# Patient Record
Sex: MALE
[De-identification: names, ages, dates, MRNs, and addresses within clinical notes are randomized per-mention and may not be internally consistent; named-entity substitution may affect disease eponyms.]

---

## 2018-06-23 ENCOUNTER — HOSPITAL ENCOUNTER (EMERGENCY)
Dept: HOSPITAL 14 - H.ER | Age: 38
Discharge: HOME | End: 2018-06-23
Payer: COMMERCIAL

## 2018-06-23 VITALS
DIASTOLIC BLOOD PRESSURE: 81 MMHG | OXYGEN SATURATION: 100 % | RESPIRATION RATE: 18 BRPM | SYSTOLIC BLOOD PRESSURE: 131 MMHG | HEART RATE: 88 BPM

## 2018-06-23 VITALS — TEMPERATURE: 98.2 F

## 2018-06-23 VITALS — BODY MASS INDEX: 21.5 KG/M2
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
## 2018-06-23 DIAGNOSIS — S13.4XXA: Primary | ICD-10-CM

## 2018-06-23 DIAGNOSIS — S09.90XA: ICD-10-CM
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
## 2018-08-09 ENCOUNTER — HOSPITAL ENCOUNTER (INPATIENT)
Dept: HOSPITAL 14 - H.ER | Age: 38
LOS: 1 days | Discharge: HOME | DRG: 183 | End: 2018-08-10
Attending: FAMILY MEDICINE | Admitting: FAMILY MEDICINE
Payer: SELF-PAY

## 2018-08-09 VITALS — OXYGEN SATURATION: 100 %

## 2018-08-09 VITALS — BODY MASS INDEX: 30.2 KG/M2

## 2018-08-09 DIAGNOSIS — J45.909: ICD-10-CM

## 2018-08-09 DIAGNOSIS — K76.0: ICD-10-CM

## 2018-08-09 DIAGNOSIS — Z91.013: ICD-10-CM

## 2018-08-09 DIAGNOSIS — R73.03: ICD-10-CM

## 2018-08-09 DIAGNOSIS — K57.32: Primary | ICD-10-CM

## 2018-08-09 DIAGNOSIS — K52.9: ICD-10-CM

## 2018-08-09 DIAGNOSIS — R16.2: ICD-10-CM

## 2018-08-09 DIAGNOSIS — E66.9: ICD-10-CM

## 2018-08-09 DIAGNOSIS — E78.2: ICD-10-CM

## 2018-08-09 LAB
ALBUMIN SERPL-MCNC: 4.1 G/DL (ref 3.5–5)
ALBUMIN/GLOB SERPL: 1.3 {RATIO} (ref 1–2.1)
ALT SERPL-CCNC: 27 U/L (ref 21–72)
AST SERPL-CCNC: 20 U/L (ref 17–59)
BASOPHILS # BLD AUTO: 0 K/UL (ref 0–0.2)
BASOPHILS NFR BLD: 0.4 % (ref 0–2)
BILIRUB UR-MCNC: NEGATIVE MG/DL
BUN SERPL-MCNC: 17 MG/DL (ref 9–20)
CALCIUM SERPL-MCNC: 8.9 MG/DL (ref 8.4–10.2)
COLOR UR: YELLOW
EOSINOPHIL # BLD AUTO: 0.1 K/UL (ref 0–0.7)
EOSINOPHIL NFR BLD: 1.3 % (ref 0–4)
ERYTHROCYTE [DISTWIDTH] IN BLOOD BY AUTOMATED COUNT: 13.5 % (ref 11.5–14.5)
GFR NON-AFRICAN AMERICAN: > 60
GLUCOSE UR STRIP-MCNC: >=500 MG/DL
HGB BLD-MCNC: 14.6 G/DL (ref 12–18)
LEUKOCYTE ESTERASE UR-ACNC: (no result) LEU/UL
LYMPHOCYTES # BLD AUTO: 1.7 K/UL (ref 1–4.3)
LYMPHOCYTES NFR BLD AUTO: 23.3 % (ref 20–40)
MCH RBC QN AUTO: 28.5 PG (ref 27–31)
MCHC RBC AUTO-ENTMCNC: 34.8 G/DL (ref 33–37)
MCV RBC AUTO: 82 FL (ref 80–94)
MONOCYTES # BLD: 0.6 K/UL (ref 0–0.8)
MONOCYTES NFR BLD: 7.9 % (ref 0–10)
NEUTROPHILS # BLD: 4.9 K/UL (ref 1.8–7)
NEUTROPHILS NFR BLD AUTO: 67.1 % (ref 50–75)
NRBC BLD AUTO-RTO: 0.1 % (ref 0–0)
PH UR STRIP: 6 [PH] (ref 5–8)
PLATELET # BLD: 304 K/UL (ref 130–400)
PMV BLD AUTO: 9.3 FL (ref 7.2–11.7)
PROT UR STRIP-MCNC: NEGATIVE MG/DL
RBC # BLD AUTO: 5.13 MIL/UL (ref 4.4–5.9)
RBC # UR STRIP: NEGATIVE /UL
SP GR UR STRIP: 1.01 (ref 1–1.03)
URINE CLARITY: CLEAR
UROBILINOGEN UR-MCNC: (no result) MG/DL (ref 0.2–1)
WBC # BLD AUTO: 7.3 K/UL (ref 4.8–10.8)

## 2018-08-09 RX ADMIN — CIPROFLOXACIN SCH MLS/HR: 2 INJECTION, SOLUTION INTRAVENOUS at 21:35

## 2018-08-09 NOTE — CP.PCM.CON
History of Present Illness





- History of Present Illness


History of Present Illness: 


General Surgery Consult


Re: recurrent diverticulitis





HPI: 38M presented to the ED c/o of left lower quadrant pain that began last 

night. Pain was 8/10 in severity. He has had multiple recurrences, with the 

most recent earlier this year and abx relieved his symptoms. + nausea, chills, 

and non-bloody diarrhea. No complaints or other issues at this time, feeling 

better. Last Cscope 1/31/17. 








PMH: Asthma (as child), pre-diabetes, hypertriglyceridemia, diverticulitis (

first in 2015)


PSH: Denies


SH: Social EtOH, no tobacco, or drug use


FH: non contributory


All: NKDA


Meds: Denies





Review of Systems





- Review of Systems


All systems: reviewed and no additional remarkable complaints except (as per HPI

)





Past Patient History





- Infectious Disease


Hx of Infectious Diseases: None





- Tetanus Immunizations


Tetanus Immunization: Unknown





- Past Medical History & Family History


Past Medical History?: Yes





- Past Social History


Smoking Status: Never Smoked


Chewing Tobacco Use: No


Cigar Use: No


Alcohol: None


Drugs: Denies


Home Situation {Lives}: With Family





- CARDIAC


Hx Cardiac Disorders: No


Hx Angina: No


Hx Atrial Fibrillation: No


Hx Cardia Arrhythmia: No


Hx Circulatory Problems: No


Hx Heart Attack: No


Hx Hypercholesterolemia: No





- PULMONARY


Hx Respiratory Disorders: No





- NEUROLOGICAL


Hx Migraine: Yes





- HEENT


Hx HEENT Problems: No





- RENAL


Hx Chronic Kidney Disease: No





- ENDOCRINE/METABOLIC


Hx Endocrine Disorders: Yes


Other/Comment: pre -diabetes





- HEMATOLOGICAL/ONCOLOGICAL


Hx Human Immunodeficiency Virus (HIV): No





- INTEGUMENTARY


Hx Dermatological Problems: No





- MUSCULOSKELETAL/RHEUMATOLOGICAL


Hx Falls: No





- GASTROINTESTINAL


Hx Diverticulitis: Yes





- GENITOURINARY/GYNECOLOGICAL


Hx Genitourinary Disorders: No





- PSYCHIATRIC


Hx Psychophysiologic Disorder: No


Hx Substance Use: No





- SURGICAL HISTORY


Hx Surgeries: No





- ANESTHESIA


Hx Anesthesia: No


Hx Anesthesia Reactions: No





Meds


Allergies/Adverse Reactions: 


 Allergies











Allergy/AdvReac Type Severity Reaction Status Date / Time


 


FISH Allergy  RASH Verified 08/09/18 09:00


 


Fish Containing Products Allergy  RASH Verified 08/09/18 09:00














- Medications


Medications: 


 Current Medications





Enoxaparin Sodium (Lovenox)  40 mg SC DAILY LEORA


   PRN Reason: Protocol


Ciprofloxacin (Cipro 400mg/200ml Dsw)  400 mg in 200 mls @ 200 mls/hr IVPB Q12 

LEORA


   PRN Reason: Protocol


Metronidazole (Flagyl 500mg/100ml Ns)  100 mls @ 100 mls/hr IVPB Q8 LEORA


   PRN Reason: Protocol


Sodium Chloride (Sodium Chloride 0.9%)  1,000 mls @ 125 mls/hr IV .Q8H LEORA


   Stop: 08/10/18 08:44


   Last Admin: 08/09/18 17:33 Dose:  125 mls/hr


Ketorolac Tromethamine (Toradol)  15 mg IVP Q6 PRN


   PRN Reason: Pain, moderate (4-7)


Ketorolac Tromethamine (Toradol)  30 mg IVP Q6 PRN


   PRN Reason: Pain, severe (8-10)











Physical Exam





- Constitutional


Appears: Non-toxic, No Acute Distress





- Head Exam


Head Exam: ATRAUMATIC, NORMOCEPHALIC





- Eye Exam


Eye Exam: EOMI.  absent: Scleral icterus





- ENT Exam


ENT Exam: Mucous Membranes Moist


Additional comments: 





trachea midline





- Respiratory Exam


Respiratory Exam: NORMAL BREATHING PATTERN.  absent: Respiratory Distress





- Cardiovascular Exam


Cardiovascular Exam: RRR, +S1, +S2





- GI/Abdominal Exam


GI & Abdominal Exam: Soft, Tenderness (mild in LLQ).  absent: Distended, Firm, 

Guarding, Hernia, Rebound, Rigid





- Rectal Exam


Rectal Exam: Deferred





- Extremities Exam


Extremities exam: Positive for: normal capillary refill.  Negative for: calf 

tenderness, pedal edema





- Back Exam


Back exam: absent: CVA tenderness (L), CVA tenderness (R)





- Neurological Exam


Neurological exam: Alert, Oriented x3





- Skin


Skin Exam: Dry, Warm





Results





- Vital Signs


Recent Vital Signs: 


 Last Vital Signs











Temp  97.9 F   08/09/18 16:35


 


Pulse  65   08/09/18 16:45


 


Resp  16   08/09/18 16:45


 


BP  130/78   08/09/18 16:35


 


Pulse Ox  98   08/09/18 16:45














- Labs


Result Diagrams: 


 08/09/18 10:10





 08/09/18 10:10


Labs: 


 Laboratory Results - last 24 hr











  08/09/18 08/09/18 08/09/18





  10:10 10:10 10:20


 


WBC  7.3  


 


RBC  5.13  


 


Hgb  14.6  


 


Hct  42.0  


 


MCV  82.0  D  


 


MCH  28.5  


 


MCHC  34.8  


 


RDW  13.5  


 


Plt Count  304  


 


MPV  9.3  


 


Neut % (Auto)  67.1  


 


Lymph % (Auto)  23.3  


 


Mono % (Auto)  7.9  


 


Eos % (Auto)  1.3  


 


Baso % (Auto)  0.4  


 


Neut # (Auto)  4.9  


 


Lymph # (Auto)  1.7  


 


Mono # (Auto)  0.6  


 


Eos # (Auto)  0.1  


 


Baso # (Auto)  0.0  


 


Sodium   143 


 


Potassium   3.6 


 


Chloride   108 H 


 


Carbon Dioxide   25 


 


Anion Gap   14 


 


BUN   17 


 


Creatinine   0.7 L 


 


Est GFR ( Amer)   > 60 


 


Est GFR (Non-Af Amer)   > 60 


 


Random Glucose   110 


 


Calcium   8.9 


 


Total Bilirubin   0.5 


 


AST   20 


 


ALT   27 


 


Alkaline Phosphatase   66 


 


Total Protein   7.2 


 


Albumin   4.1 


 


Globulin   3.1 


 


Albumin/Globulin Ratio   1.3 


 


Urine Color    Yellow


 


Urine Clarity    Clear


 


Urine pH    6.0


 


Ur Specific Gravity    1.013


 


Urine Protein    Negative


 


Urine Glucose (UA)    >=500


 


Urine Ketones    Negative


 


Urine Blood    Negative


 


Urine Nitrate    Negative


 


Urine Bilirubin    Negative


 


Urine Urobilinogen    0.2-1.0


 


Ur Leukocyte Esterase    Neg


 


Urine RBC (Auto)    3


 


Urine Microscopic WBC    < 1














- Imaging and Cardiology


  ** CT scan - abdomen


Status: Image reviewed by me, Report reviewed by me





Assessment & Plan





- Assessment and Plan (Free Text)


Assessment: 


38M with recurrent diverticulitis


Plan: 


IVF


NPO


cont IV abx


Will need colonoscopy in 6-8 weeks prior to any surgical treatment.


Discussed the possibility of a surgical option for treatment after follow up 

colonoscopy with pt.


D/W Dr. Denilson Darling PGY4

## 2018-08-09 NOTE — CT
Date of service: 



08/09/2018



PROCEDURE:  CT Abdomen and Pelvis with contrast



HISTORY:

abd pain llq, history of diverticulitis



COMPARISON:

Abdomen pelvis CT with contrast 05/07/2017.



TECHNIQUE:

Following the intravenous administration of iodinated contrast 

material, a CT examination of the abdomen and pelvis performed from 

the domes of the diaphragms to the symphysis pubis with reformatted 

datasets provided in axial, sagittal and coronal planes. Oral 

contrast was not administered as per referring physician request.



Contrast dose: Omnipaque 300, 95 cc.



Radiation dose:



Total exam DLP = 689.21 mGy-cm.



This CT exam was performed using one or more of the following dose 

reduction techniques: Automated exposure control, adjustment of the 

mA and/or kV according to patient size, and/or use of iterative 

reconstruction technique.



FINDINGS:



LOWER THORAX:

Unremarkable. 



LIVER:

Diminished attenuation is indicative of hepatic steatosis diffusely. 

No gross lesion or ductal dilatation. 



GALLBLADDER AND BILE DUCTS:

Unremarkable. 



PANCREAS:

Unremarkable. No gross lesion or ductal dilatation.



SPLEEN:

Unremarkable. 



ADRENALS:

Unremarkable. No mass. 



KIDNEYS AND URETERS:

Unremarkable. No hydronephrosis. No solid mass. 



VASCULATURE:

Unremarkable. No aortic aneurysm. 



BOWEL:

The stomach is collapsed not well evaluated.  Small bowel is does not 

appear obstructed in the large bowel is normal in caliber overall as 

well.  A nearly identical pattern of diverticulitis affects the 

proximal to mid sigmoid colon although the local pericolic reaction 

is not quite is evident as previously demonstrated. Nevertheless the 

diagnosis is the same.  Differential diagnosis of other infectious or 

inflammatory causes remains as well as underlying potential neoplasm. 

 Follow-up colonoscopy is recommended following therapy.



APPENDIX:

Normal appendix. 



PERITONEUM:

Unremarkable. No free fluid. No free air. 



LYMPH NODES:

Unremarkable. No enlarged lymph nodes. 



BLADDER:

Unremarkable. 



REPRODUCTIVE:

Unremarkable. 



BONES:

No acute fracture. 



OTHER FINDINGS:

None.



IMPRESSION:

Recurrent diverticulitis proximal to mid sigmoid colon not abscess, 

free intrarenal gas or ascites.



Hepatic steatosis mild-to-moderate severity.

## 2018-08-09 NOTE — ED PDOC
HPI: Abdomen


Time Seen by Provider: 08/09/18 09:00


Chief Complaint (Nursing): Abdominal Pain


Chief Complaint (Provider): abdominal pain


History Per: Patient


History/Exam Limitations: language barrier (Geovani  Ryan #[id number])


Onset/Duration Of Symptoms: Days (x1)


Current Symptoms Are (Timing): Still Present


Location Of Pain/Discomfort: LLQ


Associated Symptoms: Nausea.  denies: Fever, Chills, Vomiting, Diarrhea


Additional Complaint(s): 





Torin Mahoney is a 38 year old male, with a past medical history of 

diverticulitis and diabetes, who presents to the emergency department 

complaining of left lower abdominal pain associated with nausea onset since 

yesterday. Patient reports he was diagnosed with diverticulitis this year and 

believes he might have it again. He also reports a headache but denies any fever

, chills, diarrhea or vomiting. No further medical complaints. 





PMD: Clinic.





Past Medical History


Reviewed: Historical Data, Nursing Documentation, Vital Signs


Vital Signs: 


 Last Vital Signs











Temp  97.5 F L  08/09/18 08:51


 


Pulse  81   08/09/18 08:51


 


Resp  20   08/09/18 08:51


 


BP  131/79   08/09/18 08:51


 


Pulse Ox  99   08/09/18 14:50














- Medical History


PMH: Diabetes (borderline, not on meds), Diverticulitis, Migraine


   Denies: HIV, Hypercholesterolemia, Chronic Kidney Disease





- Surgical History


Surgical History: No Surg Hx





- Family History


Family History: States: Unknown Family Hx, Diabetes





- Social History


Current smoker - smoking cessation education provided: No


Alcohol: None


Drugs: Denies





- Immunization History


Hx Tetanus Toxoid Vaccination: No


Hx Influenza Vaccination: No


Hx Pneumococcal Vaccination: No





- Home Medications


Home Medications: 


 Ambulatory Orders











 Medication  Instructions  Recorded


 


No Known Home Med  08/09/18














- Allergies


Allergies/Adverse Reactions: 


 Allergies











Allergy/AdvReac Type Severity Reaction Status Date / Time


 


FISH Allergy  RASH Verified 08/09/18 09:00


 


Fish Containing Products Allergy  RASH Verified 08/09/18 09:00














Review of Systems


ROS Statement: Except As Marked, All Systems Reviewed And Found Negative


Constitutional: Negative for: Fever, Chills


Gastrointestinal: Positive for: Nausea, Abdominal Pain (LLQ).  Negative for: 

Vomiting, Diarrhea


Neurological: Positive for: Headache





Physical Exam





- Reviewed


Nursing Documentation Reviewed: Yes


Vital Signs Reviewed: Yes





- Physical Exam


Appears: Positive for: No Acute Distress


Head Exam: Positive for: ATRAUMATIC, NORMAL INSPECTION, NORMOCEPHALIC


Skin: Positive for: Normal Color, Warm, Dry


Eye Exam: Positive for: Normal appearance, EOMI, PERRL


Neck: Positive for: Painless ROM


Cardiovascular/Chest: Positive for: Regular Rate, Rhythm.  Negative for: Murmur


Respiratory: Positive for: Normal Breath Sounds.  Negative for: Respiratory 

Distress


Gastrointestinal/Abdominal: Positive for: Soft, Tenderness (LLQ)


Back: Positive for: Normal Inspection


Extremity: Positive for: Normal ROM (upper and lower extremities).  Negative for

: Deformity, Swelling


Neurologic/Psych: Positive for: Alert, Oriented





- Laboratory Results


Result Diagrams: 


 08/09/18 10:10





 08/09/18 10:10





- ECG


O2 Sat by Pulse Oximetry: 99 (RA)


Pulse Ox Interpretation: Normal





Medical Decision Making


Medical Decision Making: 





Time: 09:18


Initial Impression: abdominal pain r/o appendicitis and diverticulitis





Initial Plan:





--Abd Pelvis PO & IV contrast [CT]


--CMP


--CBC w/ differential


--Omnipaque 240 50 ml PO


--Toradol 30 mg IV


--Sodium Chloride 1,000 ml  mls/hr


--Zofran Inj 4 mg IV


--Blood culture


--Urine culture


--Urinalysis 


--Reevaluation





Time: 12:31


CT Abd/Pelvis





FINDINGS:





LOWER THORAX:


Unremarkable. 





LIVER:


Diminished attenuation is indicative of hepatic steatosis diffusely. No gross 

lesion or ductal dilatation. 





GALLBLADDER AND BILE DUCTS:


Unremarkable. 





PANCREAS:


Unremarkable. No gross lesion or ductal dilatation.





SPLEEN:


Unremarkable. 





ADRENALS:


Unremarkable. No mass. 





KIDNEYS AND URETERS:


Unremarkable. No hydronephrosis. No solid mass. 





VASCULATURE:


Unremarkable. No aortic aneurysm. 





BOWEL:


The stomach is collapsed not well evaluated.  Small bowel is does not appear 

obstructed in the large bowel is normal in caliber overall as well.  A nearly 

identical pattern of diverticulitis affects the proximal to mid sigmoid colon 

although the local pericolic reaction is not quite is evident as previously 

demonstrated. Nevertheless the diagnosis is the same.  Differential diagnosis 

of other infectious or inflammatory causes remains as well as underlying 

potential neoplasm.  Follow-up colonoscopy is recommended following therapy.





APPENDIX:


Normal appendix. 





PERITONEUM:


Unremarkable. No free fluid. No free air. 





LYMPH NODES:


Unremarkable. No enlarged lymph nodes. 





BLADDER:


Unremarkable. 





REPRODUCTIVE:


Unremarkable. 





BONES:


No acute fracture. 





OTHER FINDINGS:


None.





IMPRESSION:


Recurrent diverticulitis proximal to mid sigmoid colon not abscess, free 

intrarenal gas or ascites.





Hepatic steatosis mild-to-moderate severity.








14:35


-Given the fact is recurrent diverticulitis, patient will be referred to family 

practice. They were made aware. 





--------------------------------------------------------------------------------

-----





Scribe Attestation:


Documented by Tj Gallardo, acting as a scribe for Marian Rodriguez MD.





Provider Scribe Attestation:


All medical record entries made by the Scribe were at my direction and 

personally dictated by me. I have reviewed the chart and agree that the record 

accurately reflects my personal performance of the history, physical exam, 

medical decision making, and the department course for this patient. I have 

also personally directed, reviewed, and agree with the discharge instructions 

and disposition.





Disposition





- Clinical Impression


Clinical Impression: 


 Abdominal pain, Colitis








- Disposition


Disposition Time: 14:35


Condition: IMPROVED

## 2018-08-09 NOTE — CP.PCM.HP
History of Present Illness





- History of Present Illness


History of Present Illness: 


Voyce: [id number]





"I started having pain in my lower abdomen last night"





39 y/o male w/ pmhx of pre-diabetes, hypertriglyceridemia, and diverticulitis (

diagnosed April 2015) who c/o of left lower quadrant pain that began last 

night. He states that the pain is stabbing in nature, 8/10 in severity. He 

states that he had one similar episode this year and that antibiotics relieved 

his symptoms. He denies other alleviating or aggravating factors. He admits to 

nausea, chills, and non-bloody diarrhea. He denies polydipsia, polyphagia, 

polyurea, vomiting, fever. 








PMHx: childhood asthma, pre-diabetes, hypertriglyceridemia, diverticulitis 


Surgical hx: none


Social hx: , lives w/ wife and kids. Works as a cook. Denies etoh, 

cigarette, recreational drug use


Family hx: Father had DM2 and passed away in his 80s


Home Meds: None


Allergies: None


Next of Kin: Wife, Gertrude Bonilla [phone]


Code status: Full code 





ED Course:


VS: T97.5 HR 81 /79 RR 20 02 sat 99


Labs:  CBC & BMP wnl, U/A sig for > 500 glucose, blood and urine cultures


Imaging: Abdominal/Pelvis CT showed diverticulitis proximal to mild sigmoid 

colon, and hepatic steatosis, no small bowel obstruction


Meds: Cirpofloxacin 400 mg IV 200mL/hr, Metronidazole 500 mg 100mL/hr, Toradol 

30mg IV, Zofran 4 mg, 1 L NS bolus  





Present on Admission





- Present on Admission


Any Indicators Present on Admission: No


History of DVT/PE: No


History of Uncontrolled Diabetes: No


Urinary Catheter: No


Decubitus Ulcer Present: No





Review of Systems





- Constitutional


Constitutional: Weight Gain.  absent: Excessive Sweating, Fatigue, Fever, 

Increased Appetite, Night Sweats





- EENT


Eyes: absent: Blurred Vision





- Cardiovascular


Cardiovascular: absent: Chest Pain, Diaphoresis, Dyspnea on Exertion, Leg Edema

, Palpitations





- Respiratory


Respiratory: absent: Cough, Dyspnea, Wheezing





- Gastrointestinal


Gastrointestinal: Abdominal Pain, Cramping, Diarrhea, Nausea.  absent: Bloating

, Constipation, Dyspepsia, Dysphagia, Vomiting





- Genitourinary


Genitourinary: absent: Change in Urinary Stream, Difficulty Urinating, Dysuria, 

Urinary Frequency, Urinary Urgency





- Musculoskeletal


Musculoskeletal: absent: Back Pain, Muscle Cramps, Numbness





- Integumentary


Integumentary: absent: Change in Pigmentation, Unusual Bruising





- Neurological


Neurological: absent: Abnormal Speech, Confusion, Dizziness, Numbness





Past Patient History





- Infectious Disease


Hx of Infectious Diseases: None





- Tetanus Immunizations


Tetanus Immunization: Unknown





- Past Medical History & Family History


Past Medical History?: Yes





- Past Social History


Smoking Status: Never Smoked


Chewing Tobacco Use: No


Cigar Use: No


Alcohol: None


Drugs: Denies


Home Situation {Lives}: With Family





- CARDIAC


Hx Cardiac Disorders: No


Hx Angina: No


Hx Atrial Fibrillation: No


Hx Cardia Arrhythmia: No


Hx Circulatory Problems: No


Hx Heart Attack: No


Hx Hypercholesterolemia: No





- PULMONARY


Hx Respiratory Disorders: No





- NEUROLOGICAL


Hx Migraine: Yes





- HEENT


Hx HEENT Problems: No





- RENAL


Hx Chronic Kidney Disease: No





- ENDOCRINE/METABOLIC


Hx Endocrine Disorders: Yes


Other/Comment: pre -diabetes





- HEMATOLOGICAL/ONCOLOGICAL


Hx Human Immunodeficiency Virus (HIV): No





- INTEGUMENTARY


Hx Dermatological Problems: No





- MUSCULOSKELETAL/RHEUMATOLOGICAL


Hx Falls: No





- GASTROINTESTINAL


Hx Diverticulitis: Yes





- GENITOURINARY/GYNECOLOGICAL


Hx Genitourinary Disorders: No





- PSYCHIATRIC


Hx Psychophysiologic Disorder: No


Hx Substance Use: No





- SURGICAL HISTORY


Hx Surgeries: No





- ANESTHESIA


Hx Anesthesia: No


Hx Anesthesia Reactions: No





Meds


Allergies/Adverse Reactions: 


 Allergies











Allergy/AdvReac Type Severity Reaction Status Date / Time


 


FISH Allergy  RASH Verified 08/09/18 09:00


 


Fish Containing Products Allergy  RASH Verified 08/09/18 09:00














Physical Exam





- Constitutional


Appears: No Acute Distress





- Head Exam


Head Exam: NORMAL INSPECTION





- Eye Exam


Eye Exam: Normal appearance





- ENT Exam


ENT Exam: Mucous Membranes Moist





- Respiratory Exam


Respiratory Exam: Clear to Auscultation Bilateral, NORMAL BREATHING PATTERN.  

absent: Chest Wall Tenderness, Wheezes





- Cardiovascular Exam


Cardiovascular Exam: REGULAR RHYTHM, +S1, +S2





- GI/Abdominal Exam


GI & Abdominal Exam: Distended, Normal Bowel Sounds, Soft, Tenderness.  absent: 

Rebound, Rigid





- Extremities Exam


Extremities exam: Positive for: normal inspection.  Negative for: pedal edema, 

tenderness





- Back Exam


Back exam: absent: CVA tenderness (L), CVA tenderness (R)





- Neurological Exam


Neurological exam: Alert, Oriented x3





- Psychiatric Exam


Psychiatric exam: Normal Affect





- Skin


Skin Exam: Dry, Intact, Warm





Results





- Vital Signs


Recent Vital Signs: 





 Last Vital Signs











Temp  97.5 F L  08/09/18 08:51


 


Pulse  81   08/09/18 08:51


 


Resp  20   08/09/18 08:51


 


BP  131/79   08/09/18 08:51


 


Pulse Ox  99   08/09/18 14:50














- Labs


Result Diagrams: 


 08/09/18 10:10





 08/09/18 10:10


Labs: 





 Laboratory Results - last 24 hr











  08/09/18 08/09/18 08/09/18





  10:10 10:10 10:20


 


WBC  7.3  


 


RBC  5.13  


 


Hgb  14.6  


 


Hct  42.0  


 


MCV  82.0  D  


 


MCH  28.5  


 


MCHC  34.8  


 


RDW  13.5  


 


Plt Count  304  


 


MPV  9.3  


 


Neut % (Auto)  67.1  


 


Lymph % (Auto)  23.3  


 


Mono % (Auto)  7.9  


 


Eos % (Auto)  1.3  


 


Baso % (Auto)  0.4  


 


Neut # (Auto)  4.9  


 


Lymph # (Auto)  1.7  


 


Mono # (Auto)  0.6  


 


Eos # (Auto)  0.1  


 


Baso # (Auto)  0.0  


 


Sodium   143 


 


Potassium   3.6 


 


Chloride   108 H 


 


Carbon Dioxide   25 


 


Anion Gap   14 


 


BUN   17 


 


Creatinine   0.7 L 


 


Est GFR ( Amer)   > 60 


 


Est GFR (Non-Af Amer)   > 60 


 


Random Glucose   110 


 


Calcium   8.9 


 


Total Bilirubin   0.5 


 


AST   20 


 


ALT   27 


 


Alkaline Phosphatase   66 


 


Total Protein   7.2 


 


Albumin   4.1 


 


Globulin   3.1 


 


Albumin/Globulin Ratio   1.3 


 


Urine Color    Yellow


 


Urine Clarity    Clear


 


Urine pH    6.0


 


Ur Specific Gravity    1.013


 


Urine Protein    Negative


 


Urine Glucose (UA)    >=500


 


Urine Ketones    Negative


 


Urine Blood    Negative


 


Urine Nitrate    Negative


 


Urine Bilirubin    Negative


 


Urine Urobilinogen    0.2-1.0


 


Ur Leukocyte Esterase    Neg


 


Urine RBC (Auto)    3


 


Urine Microscopic WBC    < 1














Assessment & Plan





- Assessment and Plan (Free Text)


Assessment: 


39 y/o obese male w/ pmhx of pre-diabetes, hypertriglyceridemia, and 

diverticulitis (dx on CT April 2015) c/o of severe left lower quadrant pain 

that began last night.





Acute on Chronic Diverticulitis


-Symptomatic, uncomplicated


-admit to med surg


-severe abdominal pain, nausea, and malaise


-afebrile, no leukocytosis


-c/w IVF NS 125mL/hr 


-C/w cirpo 400 mg IV and metronidazole 500mg IV


-Zofran 4 mg prn for nausea, Toradol 15mg IV Q6 PRN for moderate, 30 mg IV Q6 

PRN  for severe pain


-GI and surgery consults ordered 


-blood and urine cultures pending


-Abdominal/Pelvis CT showed diverticulitis proximal to mid sigmoid colon, and 

hepatic steatosis, no small bowel obstruction


-last colonoscopy 1/31/17: non-bleeding internal hemorrhoids 





Pre-diabetes


-last hgbA1c  (6/15/2018) 6.1


-random glucose 110, U/A glucose >500


-will follow urine culture 





Hx of Hypertriglycerdemia


-Triglyceride: 243





Diet:


-NPO





DVT prophylaxis:


-Lovenox 40 SC 





Code:


-Full code.











- Date & Time


Date: 08/09/18


Time: 16:21

## 2018-08-10 VITALS
RESPIRATION RATE: 16 BRPM | HEART RATE: 63 BPM | DIASTOLIC BLOOD PRESSURE: 82 MMHG | TEMPERATURE: 97.6 F | SYSTOLIC BLOOD PRESSURE: 132 MMHG

## 2018-08-10 LAB
% IRON SATURATION: 19 % (ref 20–55)
BASOPHILS # BLD AUTO: 0.1 K/UL (ref 0–0.2)
BASOPHILS NFR BLD: 0.9 % (ref 0–2)
BUN SERPL-MCNC: 12 MG/DL (ref 9–20)
CALCIUM SERPL-MCNC: 8.2 MG/DL (ref 8.4–10.2)
EOSINOPHIL # BLD AUTO: 0.2 K/UL (ref 0–0.7)
EOSINOPHIL NFR BLD: 2.5 % (ref 0–4)
ERYTHROCYTE [DISTWIDTH] IN BLOOD BY AUTOMATED COUNT: 13.6 % (ref 11.5–14.5)
GFR NON-AFRICAN AMERICAN: > 60
HGB BLD-MCNC: 14 G/DL (ref 12–18)
IRON SERPL-MCNC: 57 UG/DL (ref 49–181)
LYMPHOCYTES # BLD AUTO: 2.1 K/UL (ref 1–4.3)
LYMPHOCYTES NFR BLD AUTO: 29 % (ref 20–40)
MCH RBC QN AUTO: 28.6 PG (ref 27–31)
MCHC RBC AUTO-ENTMCNC: 34.9 G/DL (ref 33–37)
MCV RBC AUTO: 82 FL (ref 80–94)
MONOCYTES # BLD: 0.6 K/UL (ref 0–0.8)
MONOCYTES NFR BLD: 8.7 % (ref 0–10)
NEUTROPHILS # BLD: 4.3 K/UL (ref 1.8–7)
NEUTROPHILS NFR BLD AUTO: 58.9 % (ref 50–75)
NRBC BLD AUTO-RTO: 0.1 % (ref 0–0)
PLATELET # BLD: 292 K/UL (ref 130–400)
PMV BLD AUTO: 9 FL (ref 7.2–11.7)
RBC # BLD AUTO: 4.9 MIL/UL (ref 4.4–5.9)
TIBC SERPL-MCNC: 294 UG/DL (ref 250–450)
WBC # BLD AUTO: 7.3 K/UL (ref 4.8–10.8)

## 2018-08-10 RX ADMIN — METRONIDAZOLE SCH MLS/HR: 500 INJECTION, SOLUTION INTRAVENOUS at 13:25

## 2018-08-10 RX ADMIN — CIPROFLOXACIN SCH MLS/HR: 2 INJECTION, SOLUTION INTRAVENOUS at 09:06

## 2018-08-10 RX ADMIN — METRONIDAZOLE SCH MLS/HR: 500 INJECTION, SOLUTION INTRAVENOUS at 04:01

## 2018-08-10 NOTE — CP.PCM.PN
Subjective





- Date & Time of Evaluation


Date of Evaluation: 08/10/18


Time of Evaluation: 08:28





- Subjective


Subjective: 





General surgery progress note for Dr. Oreilly-Natalie Harrington, PGY-2





Pt S & E at bedside at 0720





Pt reports abdominal pain much improved.  Had one episode of non bloody 

diarrhea.  Denies N & V, F & C, other complaints. 








Objective





- Vital Signs/Intake and Output


Vital Signs (last 24 hours): 


 











Temp Pulse Resp BP Pulse Ox


 


 97.5 F L  58 L  20   115/68   100 


 


 08/10/18 05:00  08/10/18 05:00  08/10/18 05:00  08/10/18 05:00  08/10/18 05:00











- Medications


Medications: 


 Current Medications





Enoxaparin Sodium (Lovenox)  40 mg SC DAILY [facility]


   PRN Reason: Protocol


Ciprofloxacin (Cipro 400mg/200ml Dsw)  400 mg in 200 mls @ 200 mls/hr IVPB Q12 

[facility]


   PRN Reason: Protocol


   Last Admin: 08/09/18 21:35 Dose:  200 mls/hr


Sodium Chloride (Sodium Chloride 0.9%)  1,000 mls @ 125 mls/hr IV .Q8H [facility]


   Stop: 08/10/18 08:44


   Last Admin: 08/10/18 04:00 Dose:  125 mls/hr


Metronidazole (Flagyl 500mg/100ml Ns)  100 mls @ 100 mls/hr IVPB Q8@0500,1300,

2100 [facility]


   PRN Reason: Protocol


   Last Admin: 08/10/18 04:01 Dose:  100 mls/hr


Ketorolac Tromethamine (Toradol)  15 mg IVP Q6 PRN


   PRN Reason: Pain, moderate (4-7)


Ketorolac Tromethamine (Toradol)  30 mg IVP Q6 PRN


   PRN Reason: Pain, severe (8-10)











- Labs


Labs: 


 





 08/09/18 10:10 





 08/09/18 10:10 











- Constitutional


Appears: Non-toxic, No Acute Distress





- Head Exam


Head Exam: ATRAUMATIC, NORMAL INSPECTION, NORMOCEPHALIC





- Eye Exam


Eye Exam: EOMI, Normal appearance





- ENT Exam


ENT Exam: Mucous Membranes Moist, Normal Exam





- Neck Exam


Neck Exam: Full ROM, Normal Inspection





- Respiratory Exam


Respiratory Exam: NORMAL BREATHING PATTERN





- Cardiovascular Exam


Cardiovascular Exam: REGULAR RHYTHM





- GI/Abdominal Exam


GI & Abdominal Exam: Soft, Tenderness (mild, LLQ).  absent: Distended, Firm, 

Guarding, Rigid





- Extremities Exam


Extremities Exam: Normal Inspection





- Neurological Exam


Neurological Exam: Alert, Awake, CN II-XII Intact, Oriented x3





- Psychiatric Exam


Psychiatric exam: Normal Affect, Normal Mood





- Skin


Skin Exam: Dry, Intact, Normal Color, Warm





Assessment and Plan





- Assessment and Plan (Free Text)


Assessment: 





38M w/recurrent diverticulitis- improving





Plan: 





Ok to advance to clears


Cont Abx


FU AM labs


Pain control PRN


Will need colonoscopy in 6-8 weeks prior to surgical intervention





DW attending





Juany, PGY-2

## 2018-08-10 NOTE — CP.PCM.CON
<FrancoisdangeloLeonidas - Last Filed: 08/10/18 11:43>





History of Present Illness





- History of Present Illness


History of Present Illness: 





GI Fellow PGY4, Consult note.





Consulted for recurrent diverticulitis.





Torin Mahoney is a 38M  M with history of diverticulitis 

presenting with LLQ abdominal pain. Pain started just PTA, sharp, 8/10 located 

in the LLQ of abdomen. These symptoms are identical to previous episodes of 

diverticulitis. He states he has been following a strict diet, avoiding steak, 

nuts and seed, and eating leafy, green salads. He denies fever, vomiting an 

diarrhea at this time. This is his 4th episodes of diverticulitis in the last 2-

3 years. He had a colonoscopy in 1/17 which did not show an underlying mass or 

inflammatory process. CT on this admission shows return of the same inflamed 

colon without abscess. The CT demonstrated hepatosteatosis.





PMHx - See above. Dyslipidemia, obesity


PSHx - CSPY 1/17


FMHx - Denies colon, or stomach related cancers. Denies liver disease.


SocHx - Denies tobacco or alcohol use. 





12pt ROS completed and negative except for above.





Past Patient History





- Infectious Disease


Hx of Infectious Diseases: None





- Tetanus Immunizations


Tetanus Immunization: Unknown





- Past Medical History & Family History


Past Medical History?: Yes





- Past Social History


Smoking Status: Never Smoked


Chewing Tobacco Use: No


Cigar Use: No


Alcohol: None


Drugs: Denies


Home Situation {Lives}: With Family





- CARDIAC


Hx Cardiac Disorders: No


Hx Angina: No


Hx Atrial Fibrillation: No


Hx Cardia Arrhythmia: No


Hx Circulatory Problems: No


Hx Heart Attack: No


Hx Hypercholesterolemia: No





- PULMONARY


Hx Respiratory Disorders: No





- NEUROLOGICAL


Hx Migraine: Yes





- HEENT


Hx HEENT Problems: No





- RENAL


Hx Chronic Kidney Disease: No





- ENDOCRINE/METABOLIC


Hx Endocrine Disorders: Yes


Other/Comment: pre -diabetes





- HEMATOLOGICAL/ONCOLOGICAL


Hx Human Immunodeficiency Virus (HIV): No





- INTEGUMENTARY


Hx Dermatological Problems: No





- MUSCULOSKELETAL/RHEUMATOLOGICAL


Hx Falls: No





- GASTROINTESTINAL


Hx Diverticulitis: Yes





- GENITOURINARY/GYNECOLOGICAL


Hx Genitourinary Disorders: No





- PSYCHIATRIC


Hx Psychophysiologic Disorder: No


Hx Substance Use: No





- SURGICAL HISTORY


Hx Surgeries: No





- ANESTHESIA


Hx Anesthesia: No


Hx Anesthesia Reactions: No





Meds


Allergies/Adverse Reactions: 


 Allergies











Allergy/AdvReac Type Severity Reaction Status Date / Time


 


FISH Allergy  RASH Verified 08/09/18 09:00


 


Fish Containing Products Allergy  RASH Verified 08/09/18 09:00














- Medications


Medications: 


 Current Medications





Enoxaparin Sodium (Lovenox)  40 mg SC DAILY [facility]


   PRN Reason: Protocol


Ciprofloxacin (Cipro 400mg/200ml Dsw)  400 mg in 200 mls @ 200 mls/hr IVPB Q12 

[facility]


   PRN Reason: Protocol


   Last Admin: 08/09/18 21:35 Dose:  200 mls/hr


Sodium Chloride (Sodium Chloride 0.9%)  1,000 mls @ 125 mls/hr IV .Q8H [facility]


   Stop: 08/10/18 08:44


   Last Admin: 08/10/18 04:00 Dose:  125 mls/hr


Metronidazole (Flagyl 500mg/100ml Ns)  100 mls @ 100 mls/hr IVPB Q8@0500,1300,

2100 [facility]


   PRN Reason: Protocol


   Last Admin: 08/10/18 04:01 Dose:  100 mls/hr


Ketorolac Tromethamine (Toradol)  15 mg IVP Q6 PRN


   PRN Reason: Pain, moderate (4-7)


Ketorolac Tromethamine (Toradol)  30 mg IVP Q6 PRN


   PRN Reason: Pain, severe (8-10)











Physical Exam





- Constitutional


Appears: Well, In Acute Distress





- Head Exam


Head Exam: NORMAL INSPECTION





- Eye Exam


Eye Exam: EOMI, Normal appearance





- ENT Exam


ENT Exam: Mucous Membranes Moist, Normal Exam





- Respiratory Exam


Respiratory Exam: Clear to Auscultation Bilateral, NORMAL BREATHING PATTERN.  

absent: Stridor





- Cardiovascular Exam


Cardiovascular Exam: REGULAR RHYTHM, +S1, +S2





- GI/Abdominal Exam


GI & Abdominal Exam: Normal Bowel Sounds, Soft, Tenderness.  absent: 

Organomegaly


Additional comments: 





LLQ pain with light palpation





- Rectal Exam


Rectal Exam: Deferred





- Extremities Exam


Extremities exam: Positive for: normal inspection





- Neurological Exam


Neurological exam: Alert, CN II-XII Intact, Oriented x3





- Psychiatric Exam


Psychiatric exam: Normal Affect, Normal Mood





- Skin


Skin Exam: Dry, Intact, Normal Color





Results





- Vital Signs


Recent Vital Signs: 


 Last Vital Signs











Temp  97.5 F L  08/10/18 05:00


 


Pulse  58 L  08/10/18 05:00


 


Resp  20   08/10/18 05:00


 


BP  115/68   08/10/18 05:00


 


Pulse Ox  100   08/10/18 05:00














- Labs


Result Diagrams: 


 08/10/18 08:52





 08/10/18 08:52


Labs: 


 Laboratory Results - last 24 hr











  08/09/18 08/09/18 08/09/18





  10:10 10:10 10:20


 


WBC  7.3  


 


RBC  5.13  


 


Hgb  14.6  


 


Hct  42.0  


 


MCV  82.0  D  


 


MCH  28.5  


 


MCHC  34.8  


 


RDW  13.5  


 


Plt Count  304  


 


MPV  9.3  


 


Neut % (Auto)  67.1  


 


Lymph % (Auto)  23.3  


 


Mono % (Auto)  7.9  


 


Eos % (Auto)  1.3  


 


Baso % (Auto)  0.4  


 


Neut # (Auto)  4.9  


 


Lymph # (Auto)  1.7  


 


Mono # (Auto)  0.6  


 


Eos # (Auto)  0.1  


 


Baso # (Auto)  0.0  


 


Sodium   143 


 


Potassium   3.6 


 


Chloride   108 H 


 


Carbon Dioxide   25 


 


Anion Gap   14 


 


BUN   17 


 


Creatinine   0.7 L 


 


Est GFR ( Amer)   > 60 


 


Est GFR (Non-Af Amer)   > 60 


 


Random Glucose   110 


 


Calcium   8.9 


 


Total Bilirubin   0.5 


 


AST   20 


 


ALT   27 


 


Alkaline Phosphatase   66 


 


Total Protein   7.2 


 


Albumin   4.1 


 


Globulin   3.1 


 


Albumin/Globulin Ratio   1.3 


 


Urine Color    Yellow


 


Urine Clarity    Clear


 


Urine pH    6.0


 


Ur Specific Gravity    1.013


 


Urine Protein    Negative


 


Urine Glucose (UA)    >=500


 


Urine Ketones    Negative


 


Urine Blood    Negative


 


Urine Nitrate    Negative


 


Urine Bilirubin    Negative


 


Urine Urobilinogen    0.2-1.0


 


Ur Leukocyte Esterase    Neg


 


Urine RBC (Auto)    3


 


Urine Microscopic WBC    < 1














Assessment & Plan





- Assessment and Plan (Free Text)


Assessment: 





38M with recurrent diverticulitis





#Acute Diverticulitis, recurrent


#Hepatosplenomegaly


#Mixed dyslipidemia


#Obesity


Plan: 





-CT abd/pelv reviewed and noted for inflammation of sigmoid and hepatosteatosis


-Continue Abx for GNR and anaerobes


-Agree with surgical evaluation


-Diet: Can advance to full liquid.


-Follow up with Dr. Patel in 6-8 weeks for colonoscopy evaluation


-Discussed liver findings with patient. Likely from NAFLD. Hep panel neg in 

2015. Will order iron studies.


-Educated patient on lifestyle changes i.e. exercise, blood sugar control, 

weight loss.


-No planned procedures at this time.








- Date & Time


Date: 08/10/18


Time: 07:22





<Giacomo Matute Y - Last Filed: 08/10/18 12:59>





Meds





- Medications


Medications: 


 Current Medications





Enoxaparin Sodium (Lovenox)  40 mg SC DAILY [facility]


   PRN Reason: Protocol


   Last Admin: 08/10/18 09:09 Dose:  Not Given


Ciprofloxacin (Cipro 400mg/200ml Dsw)  400 mg in 200 mls @ 200 mls/hr IVPB Q12 

[facility]


   PRN Reason: Protocol


   Last Admin: 08/10/18 09:06 Dose:  200 mls/hr


Metronidazole (Flagyl 500mg/100ml Ns)  100 mls @ 100 mls/hr IVPB Q8@0500,1300,

2100 [facility]


   PRN Reason: Protocol


   Last Admin: 08/10/18 04:01 Dose:  100 mls/hr


Ketorolac Tromethamine (Toradol)  15 mg IVP Q6 PRN


   PRN Reason: Pain, moderate (4-7)


Ketorolac Tromethamine (Toradol)  30 mg IVP Q6 PRN


   PRN Reason: Pain, severe (8-10)











Results





- Vital Signs


Recent Vital Signs: 


 Last Vital Signs











Temp  97.6 F   08/10/18 09:00


 


Pulse  63   08/10/18 09:00


 


Resp  16   08/10/18 09:00


 


BP  132/82   08/10/18 09:00


 


Pulse Ox  100   08/10/18 09:00














- Labs


Result Diagrams: 


 08/10/18 08:52





 08/10/18 08:52


Labs: 


 Laboratory Results - last 24 hr











  08/10/18 08/10/18 08/10/18





  08:52 08:52 08:52


 


WBC  7.3  


 


RBC  4.90  


 


Hgb  14.0  


 


Hct  40.2  


 


MCV  82.0  


 


MCH  28.6  


 


MCHC  34.9  


 


RDW  13.6  


 


Plt Count  292  


 


MPV  9.0  


 


Neut % (Auto)  58.9  


 


Lymph % (Auto)  29.0  


 


Mono % (Auto)  8.7  


 


Eos % (Auto)  2.5  


 


Baso % (Auto)  0.9  


 


Neut # (Auto)  4.3  


 


Lymph # (Auto)  2.1  


 


Mono # (Auto)  0.6  


 


Eos # (Auto)  0.2  


 


Baso # (Auto)  0.1  


 


Sodium   141 


 


Potassium   3.4 L 


 


Chloride   107 


 


Carbon Dioxide   25 


 


Anion Gap   12 


 


BUN   12 


 


Creatinine   0.8 


 


Est GFR ( Amer)   > 60 


 


Est GFR (Non-Af Amer)   > 60 


 


Random Glucose   98 


 


Calcium   8.2 L 


 


Phosphorus   3.4 


 


Magnesium   1.8 


 


Iron    57


 


TIBC    294


 


% Saturation    19 L














Attending/Attestation





- Attestation


I have personally seen and examined this patient.: Yes


I have fully participated in the care of the patient.: Yes


I have reviewed all pertinent clinical information: Yes


Notes (Text): 





08/10/18 12:54


I have seen and examined patient with GI fellow.  Agree with above 

documentation with the following additions.  In brief, this is a 38 year old 

male with history of recurrent diverticulitis who presents to hospital with 

complaint of sudden onset abdominal pain which started yesterday.  He describes 

a sharp, 8/10 intensity LLQ abdominal pain which feels similar to prior 

episodes.  He denies associated nausea, vomiting, fever/chills, weight loss, 

rectal bleeding, or diarrhea.  Since arrival to hospital his pain has 

significantly improved and he is tolerating PO liquids without difficulty.  

Review of vitals from today are normal.





History of recurrent diverticulitis


Acute abdominal pain - uncomplicated sigmoid diverticulitis





- Full liquid diet as tolerated


- Continue with antibiotic therapy


- Follow up surgical recommendations given recurrent disease


- Patient had colonoscopy in early 2017, however if surgical resection is 

considered he would benefit from repeat procedure prior to intervention which 

can be performed electively as outpatient in 2 months following resolution of 

acute symptoms.  Will continue to monitor patient clinical course.

## 2018-08-10 NOTE — CP.PCM.PN
Subjective





- Date & Time of Evaluation


Date of Evaluation: 08/10/18


Time of Evaluation: 10:17





- Subjective


Subjective: 


Patient seen and examined at bedside.


He endorses having improving left lower abdominal pain.


He states that had a loose BM last night.


Per nurse, patient did not receive any pain meds over night.


Patient's charts, labs, and nurse notes reviewed. 


  





Objective





- Vital Signs/Intake and Output


Vital Signs (last 24 hours): 


 











Temp Pulse Resp BP Pulse Ox


 


 97.6 F   63   16   132/82   100 


 


 08/10/18 09:00  08/10/18 09:00  08/10/18 09:00  08/10/18 09:00  08/10/18 09:00











- Medications


Medications: 


 Current Medications





Enoxaparin Sodium (Lovenox)  40 mg SC DAILY [facility]


   PRN Reason: Protocol


   Last Admin: 08/10/18 09:09 Dose:  Not Given


Ciprofloxacin (Cipro 400mg/200ml Dsw)  400 mg in 200 mls @ 200 mls/hr IVPB Q12 

[facility]


   PRN Reason: Protocol


   Last Admin: 08/10/18 09:06 Dose:  200 mls/hr


Metronidazole (Flagyl 500mg/100ml Ns)  100 mls @ 100 mls/hr IVPB Q8@0500,1300,

2100 [facility]


   PRN Reason: Protocol


   Last Admin: 08/10/18 04:01 Dose:  100 mls/hr


Ketorolac Tromethamine (Toradol)  15 mg IVP Q6 PRN


   PRN Reason: Pain, moderate (4-7)


Ketorolac Tromethamine (Toradol)  30 mg IVP Q6 PRN


   PRN Reason: Pain, severe (8-10)











- Labs


Labs: 


 





 08/10/18 08:52 





 08/10/18 08:52 











- Constitutional


Appears: No Acute Distress





- Eye Exam


Eye Exam: Normal appearance





- ENT Exam


ENT Exam: Mucous Membranes Moist





- Respiratory Exam


Respiratory Exam: Clear to Ausculation Bilateral, NORMAL BREATHING PATTERN





- Cardiovascular Exam


Cardiovascular Exam: REGULAR RHYTHM, RRR, +S1, +S2





- GI/Abdominal Exam


GI & Abdominal Exam: Soft, Tenderness (LLQ), Normal Bowel Sounds.  absent: 

Distended, Guarding, Rigid, Rebound





- Extremities Exam


Extremities Exam: Normal Inspection.  absent: Pedal Edema, Tenderness





- Back Exam


Back Exam: NORMAL INSPECTION.  absent: CVA tenderness (L), CVA tenderness (R)





- Neurological Exam


Neurological Exam: Alert, Awake, Oriented x3





- Psychiatric Exam


Psychiatric exam: Normal Affect, Normal Mood





- Skin


Skin Exam: Dry, Intact, Warm





Assessment and Plan





- Assessment and Plan (Free Text)


Assessment: 


37 y/o obese male w/ pmhx of pre-diabetes, hypertriglyceridemia, and 

diverticulitis (dx on CT April 2015) admitted for severe abdominal pain.





Acute on Chronic Diverticulitis


-Symptomatic, uncomplicated


-pain improving


-afebrile, no leukocytosis


-C/w cirpo 400 mg IV and metronidazole 500mg IV day #2/10 


-Toradol 15mg IV Q6 PRN for moderate, 30 mg IV Q6 PRN  for severe pain


-GI and surgery consulted; appreciate recs; patient will  need outpatient 

colonoscopy prior to surgical intervention 


-blood cx: no growth after 24 hrs. Urine cultures no growth


-Abdominal/Pelvis CT showed "pattern of diverticulitis" proximal to mid sigmoid 

colon w/ ddx infectious/inflammatory causes and underlying potential neoplasm 


-last colonoscopy 1/31/17: non-bleeding internal hemorrhoids, no diverticulosis/

diverticulitis





Pre-diabetes


-last hgbA1c  (6/15/2018) 6.1


-random glucose 110, U/A glucose >500


-will follow urine culture 





Hx of Hypertriglycerdemia


-Triglyceride: 243


-will monitor outpatient 





Diet:


-advance diet as tolerated





DVT prophylaxis:


-Lovenox 40 SC 





Code:


-Full code.

## 2018-08-12 NOTE — CP.PCM.DIS
Provider





- Provider


Date of Admission: 


08/09/18 14:28





Attending physician: 


Layla Power MD





Consults: 


Dr. Patel (GI), Dr. Oreilly (surgery)


Time Spent in preparation of Discharge (in minutes): 35





Diagnosis





- Discharge Diagnosis


(1) Abdominal pain


Status: Resolved   Priority: Low   





(2) Colitis


Status: Chronic   Priority: Low   





Hospital Course





- Lab Results


Lab Results: 


 Micro Results





08/09/18 10:10   Blood-Venous   Blood Culture - Preliminary


                            NO GROWTH AFTER 3 DAYS


08/09/18 10:20   Urine,Clean Catch   Urine Culture - Final


                            No Growth (<1,000 CFU/ML)





 Most Recent Lab Values











WBC  7.3 K/uL (4.8-10.8)   08/10/18  08:52    


 


RBC  4.90 Mil/uL (4.40-5.90)   08/10/18  08:52    


 


Hgb  14.0 g/dL (12.0-18.0)   08/10/18  08:52    


 


Hct  40.2 % (35.0-51.0)   08/10/18  08:52    


 


MCV  82.0 fl (80.0-94.0)   08/10/18  08:52    


 


MCH  28.6 pg (27.0-31.0)   08/10/18  08:52    


 


MCHC  34.9 g/dL (33.0-37.0)   08/10/18  08:52    


 


RDW  13.6 % (11.5-14.5)   08/10/18  08:52    


 


Plt Count  292 K/uL (130-400)   08/10/18  08:52    


 


MPV  9.0 fl (7.2-11.7)   08/10/18  08:52    


 


Neut % (Auto)  58.9 % (50.0-75.0)   08/10/18  08:52    


 


Lymph % (Auto)  29.0 % (20.0-40.0)   08/10/18  08:52    


 


Mono % (Auto)  8.7 % (0.0-10.0)   08/10/18  08:52    


 


Eos % (Auto)  2.5 % (0.0-4.0)   08/10/18  08:52    


 


Baso % (Auto)  0.9 % (0.0-2.0)   08/10/18  08:52    


 


Neut # (Auto)  4.3 K/uL (1.8-7.0)   08/10/18  08:52    


 


Lymph # (Auto)  2.1 K/uL (1.0-4.3)   08/10/18  08:52    


 


Mono # (Auto)  0.6 K/uL (0.0-0.8)   08/10/18  08:52    


 


Eos # (Auto)  0.2 K/uL (0.0-0.7)   08/10/18  08:52    


 


Baso # (Auto)  0.1 K/uL (0.0-0.2)   08/10/18  08:52    


 


Sodium  141 mmol/l (132-148)   08/10/18  08:52    


 


Potassium  3.4 MMOL/L (3.6-5.0)  L  08/10/18  08:52    


 


Chloride  107 mmol/L ()   08/10/18  08:52    


 


Carbon Dioxide  25 mmol/L (22-30)   08/10/18  08:52    


 


Anion Gap  12  (10-20)   08/10/18  08:52    


 


BUN  12 mg/dl (9-20)   08/10/18  08:52    


 


Creatinine  0.8 mg/dl (0.8-1.5)   08/10/18  08:52    


 


Est GFR ( Amer)  > 60   08/10/18  08:52    


 


Est GFR (Non-Af Amer)  > 60   08/10/18  08:52    


 


Random Glucose  98 mg/dL ()   08/10/18  08:52    


 


Calcium  8.2 mg/dL (8.4-10.2)  L  08/10/18  08:52    


 


Phosphorus  3.4 mg/dl (2.5-4.5)   08/10/18  08:52    


 


Magnesium  1.8 MG/DL (1.6-2.3)   08/10/18  08:52    


 


Iron  57 ug/dL ()   08/10/18  08:52    


 


TIBC  294 ug/dL (250-450)   08/10/18  08:52    


 


% Saturation  19 % (20-55)  L  08/10/18  08:52    


 


Total Bilirubin  0.5 mg/dl (0.2-1.3)   08/09/18  10:10    


 


AST  20 U/L (17-59)   08/09/18  10:10    


 


ALT  27 U/L (21-72)   08/09/18  10:10    


 


Alkaline Phosphatase  66 U/L ()   08/09/18  10:10    


 


Total Protein  7.2 G/DL (6.3-8.2)   08/09/18  10:10    


 


Albumin  4.1 g/dL (3.5-5.0)   08/09/18  10:10    


 


Globulin  3.1 gm/dL (2.2-3.9)   08/09/18  10:10    


 


Albumin/Globulin Ratio  1.3  (1.0-2.1)   08/09/18  10:10    


 


Urine Color  Yellow  (YELLOW)   08/09/18  10:20    


 


Urine Clarity  Clear  (Clear)   08/09/18  10:20    


 


Urine pH  6.0  (5.0-8.0)   08/09/18  10:20    


 


Ur Specific Gravity  1.013  (1.003-1.030)   08/09/18  10:20    


 


Urine Protein  Negative mg/dL (NEGATIVE)   08/09/18  10:20    


 


Urine Glucose (UA)  >=500 mg/dL (Normal)   08/09/18  10:20    


 


Urine Ketones  Negative mg/dL (NEGATIVE)   08/09/18  10:20    


 


Urine Blood  Negative  (NEGATIVE)   08/09/18  10:20    


 


Urine Nitrate  Negative  (NEGATIVE)   08/09/18  10:20    


 


Urine Bilirubin  Negative  (NEGATIVE)   08/09/18  10:20    


 


Urine Urobilinogen  0.2-1.0 mg/dL (0.2-1.0)   08/09/18  10:20    


 


Ur Leukocyte Esterase  Neg Bertram/uL (Negative)   08/09/18  10:20    


 


Urine RBC (Auto)  3 /hpf (0-3)   08/09/18  10:20    


 


Urine Microscopic WBC  < 1 /hpf (0-5)   08/09/18  10:20    














- Hospital Course


Hospital Course: 


39 y/o male w/ pmhx of pre-diabetes, hypertriglyceridemia, and diverticulitis (

diagnosed April 2015 via abd CT) who was admitted for severe left lower 

quadrant abdominal pain. Abdominal/Pelvis CT showed pattern of diverticulitis 

proximal to mid sigmoid colon w/ ddx infectious/inflammatory causes and 

underlying potential neoplasm. (Last colonoscopy 1/31/17: non-bleeding internal 

hemorrhoids, no evidence of diverticulosis). Patient was started on  

Cirpofloxacin 400 mg IV 200mL/hr, Metronidazole 500 mg 100mL/hr, Toradol 30mg IV

, Zofran 4 mg, and given IVF 1 L NS bolus. Surgery was consulted and 

recommended a repeat colonoscopy outpatient in 2 months prior to surgical 

intervention. Patient's symptoms improved; he tolerated advancement of diet. 

Patient advised to continue antibiotics for a total of 10 days. He was 

discharged to home and will follow up in [facility] on 8/17/2018 at 3PM w/ Dr. Chris Reese. He will need a referral to GI clinic.  





Patient was disharged on:





Metronidazole 500mg PO Q8 for 8 days


Cirpofloxacin 500mg PO Q12 for 8 days   





Follow blood culture








- Date & Time of H&P


Date of H&P: 08/09/18


Time of H&P: 15:30





Discharge Exam





- Head Exam


Head Exam: NORMAL INSPECTION





- Eye Exam


Eye Exam: Normal appearance.  absent: Scleral icterus





- ENT Exam


ENT Exam: Mucous Membranes Moist





- Respiratory Exam


Respiratory Exam: Clear to PA & Lateral, NORMAL BREATHING PATTERN





- Cardiovascular Exam


Cardiovascular Exam: REGULAR RHYTHM, +S1, +S2





- GI/Abdominal Exam


GI & Abdominal Exam: Normal Bowel Sounds, Unremarkable.  absent: Distended, 

Guarding, Rebound, Rigid, Tenderness





- Extremities Exam


Extremities exam: normal inspection





- Back Exam


Back exam: absent: CVA tenderness (L), CVA tenderness (R)





- Neurological Exam


Neurological exam: Alert, Oriented x3





- Psychiatric Exam


Psychiatric exam: Normal Affect, Normal Mood





- Skin


Skin Exam: Dry, Intact, Warm





Discharge Plan





- Discharge Medications


Prescriptions: 


Ciprofloxacin [Cipro] 500 mg PO Q12 8 Days  tab


Metronidazole [Flagyl] 500 mg PO Q8 8 Days  tablet





- Follow Up Plan


Condition: IMPROVED


Disposition: HOME/ ROUTINE


Patient education suggested?: Yes


Instructions:  Diverticulitis, Acute Abdominal Pain (DC)


Additional Instructions: 


follow up in the [facility] as scheduled for 8/17/18 at 

3PM w/ Dr. Chris Reese. Will refer patient to GI clinic at [facility]


Take antibiotics as prescribed 


Return to the ED with any worsening or concerning symptoms


Referrals: 


 Service [Outside]


[facility] [Outside]


Kenneth Figueroa MD [Staff Provider] -

## 2018-12-16 ENCOUNTER — HOSPITAL ENCOUNTER (EMERGENCY)
Dept: HOSPITAL 14 - H.ER | Age: 38
Discharge: HOME | End: 2018-12-16
Payer: SELF-PAY

## 2018-12-16 VITALS
SYSTOLIC BLOOD PRESSURE: 142 MMHG | HEART RATE: 75 BPM | TEMPERATURE: 98.2 F | DIASTOLIC BLOOD PRESSURE: 78 MMHG | RESPIRATION RATE: 18 BRPM

## 2018-12-16 VITALS — BODY MASS INDEX: 29.9 KG/M2

## 2018-12-16 DIAGNOSIS — N39.0: Primary | ICD-10-CM

## 2018-12-16 DIAGNOSIS — R30.0: ICD-10-CM

## 2018-12-16 LAB
ALBUMIN SERPL-MCNC: 4.1 G/DL (ref 3.5–5)
ALBUMIN/GLOB SERPL: 1.2 {RATIO} (ref 1–2.1)
ALT SERPL-CCNC: 26 U/L (ref 21–72)
AST SERPL-CCNC: 17 U/L (ref 17–59)
BASOPHILS # BLD AUTO: 0.1 K/UL (ref 0–0.2)
BASOPHILS NFR BLD: 0.7 % (ref 0–2)
BILIRUB UR-MCNC: NEGATIVE MG/DL
BUN SERPL-MCNC: 18 MG/DL (ref 9–20)
CALCIUM SERPL-MCNC: 9.1 MG/DL (ref 8.4–10.2)
COLOR UR: YELLOW
EOSINOPHIL # BLD AUTO: 0.2 K/UL (ref 0–0.7)
EOSINOPHIL NFR BLD: 2.8 % (ref 0–4)
ERYTHROCYTE [DISTWIDTH] IN BLOOD BY AUTOMATED COUNT: 13.8 % (ref 11.5–14.5)
GFR NON-AFRICAN AMERICAN: > 60
GLUCOSE UR STRIP-MCNC: (no result) MG/DL
HGB BLD-MCNC: 14.7 G/DL (ref 12–18)
LEUKOCYTE ESTERASE UR-ACNC: (no result) LEU/UL
LYMPHOCYTES # BLD AUTO: 2.1 K/UL (ref 1–4.3)
LYMPHOCYTES NFR BLD AUTO: 26.8 % (ref 20–40)
MCH RBC QN AUTO: 27.8 PG (ref 27–31)
MCHC RBC AUTO-ENTMCNC: 33.2 G/DL (ref 33–37)
MCV RBC AUTO: 83.6 FL (ref 80–94)
MONOCYTES # BLD: 0.9 K/UL (ref 0–0.8)
MONOCYTES NFR BLD: 12 % (ref 0–10)
NEUTROPHILS # BLD: 4.5 K/UL (ref 1.8–7)
NEUTROPHILS NFR BLD AUTO: 57.7 % (ref 50–75)
NRBC BLD AUTO-RTO: 0.1 % (ref 0–0)
PH UR STRIP: 5 [PH] (ref 5–8)
PLATELET # BLD: 325 K/UL (ref 130–400)
PMV BLD AUTO: 8.7 FL (ref 7.2–11.7)
PROT UR STRIP-MCNC: 30 MG/DL
RBC # BLD AUTO: 5.28 MIL/UL (ref 4.4–5.9)
RBC # UR STRIP: NEGATIVE /UL
SP GR UR STRIP: 1.02 (ref 1–1.03)
SQUAMOUS EPITHIAL: < 1 /HPF (ref 0–5)
URINE CLARITY: (no result)
UROBILINOGEN UR-MCNC: (no result) MG/DL (ref 0.2–1)
WBC # BLD AUTO: 7.9 K/UL (ref 4.8–10.8)

## 2018-12-16 NOTE — ED PDOC
HPI: Male  Pain


Time Seen by Provider: 18 11:57


Chief Complaint (Nursing): Abdominal Pain


Chief Complaint (Provider): Abdominal pain


History Per: Patient


History/Exam Limitations: no limitations


Additional Complaint(s): 





Pt reports suprapubic pain with dysuria X 1 week, intermittent, no radiation, 

feels different from diverticulitis pain.  Denies fever, nausea, vomiting, 

constipation, diarrhea, hematuria, back pain. 





Past Medical History


Reviewed: Nursing Documentation, Vital Signs


Vital Signs: 





                                Last Vital Signs











Temp  97.5 F L  18 11:47


 


Pulse  84   18 11:47


 


Resp      


 


BP  134/84   18 11:47


 


Pulse Ox  98   18 11:47














- Medical History


PMH: Diabetes (borderline, not on meds), Diverticulitis, Migraine


   Denies: Atrial Fibrillation, Cardia Arrhythmia, HIV, Hypercholesterolemia, 

Chronic Kidney Disease





- Family History


Family History: States: Unknown Family Hx, Diabetes





- Social History


Current smoker - smoking cessation education provided: No


Alcohol: None





- Immunization History


Hx Tetanus Toxoid Vaccination: No


Hx Influenza Vaccination: No


Hx Pneumococcal Vaccination: No





- Home Medications


Home Medications: 


                                Ambulatory Orders











 Medication  Instructions  Recorded


 


Ciprofloxacin HCl [Cipro] 500 mg PO BID #14 tab 18


 


Metronidazole [Flagyl] 500 mg PO Q8 #21 tab 18


 


Nitrofurantoin Macrocrystals 100 mg PO BID #13 cap 18





[Macrobid]  


 


Phenazopyridine [Pyridium] 200 mg PO TID PRN #6 tab 18














- Allergies


Allergies/Adverse Reactions: 


                                    Allergies











Allergy/AdvReac Type Severity Reaction Status Date / Time


 


FISH Allergy  RASH Verified 18 11:52


 


Fish Containing Products Allergy  RASH Verified 18 11:52














Review of Systems


Constitutional: Negative for: Fever, Chills


Cardiovascular: Negative for: Chest Pain


Respiratory: Negative for: Cough


Gastrointestinal: Positive for: Abdominal Pain.  Negative for: Nausea, Vomiting,

 Diarrhea, Constipation, Hematochezia, Hematemesis


Genitourinary Male: Positive for: Dysuria.  Negative for: Frequency, 

Incontinence, Hematuria, Penile Discharge, Scrotal Pain, Rash, Penile Pain


Musculoskeletal: Negative for: Back Pain


Skin: Negative for: Rash, Lesions


Neurological: Negative for: Headache





Physical Exam





- Reviewed


Nursing Documentation Reviewed: Yes


Vital Signs Reviewed: Yes





- Physical Exam


Appears: Positive for: Well, No Acute Distress


Head Exam: Positive for: ATRAUMATIC, NORMAL INSPECTION


Skin: Positive for: Normal Color, Warm, Dry


Eye Exam: Positive for: Normal appearance, EOMI, PERRL


Cardiovascular/Chest: Positive for: Regular Rate, Rhythm


Respiratory: Positive for: Normal Breath Sounds


Gastrointestinal/Abdominal: Positive for: Bowel Sounds, Soft, Tenderness 

(Suprapubic).  Negative for: Mass, Distended, Guarding, Rebound


Back: Positive for: Normal Inspection.  Negative for: L CVA Tenderness, R CVA 

Tenderness


Neurologic/Psych: Positive for: Alert, Oriented





- Laboratory Results


Result Diagrams: 


                                 18 14:30





                                 18 14:30





- ECG


O2 Sat by Pulse Oximetry: 98





Medical Decision Making


Medical Decision Makin yo male with dysuria and suprapubic pain.


- UA


- urine culture


- renal/bladder ultrasound





Accession No. : [id number]


Patient Name / ID : MAYNOR WILLIS  / [id number]


Exam Date : 2018 15:17:11 ( Approved )


Study Comment : 


Sex / Age : M  / 38Y





Creator : Lenin Oden MD


Dictator : Lenin Oden MD


 : 


Approver : Lenin Oden MD


Approver2 : 





Report Date : 2018 16:15:02


My Comment : 


******************************************

*****************************************





Date of service: 





2018





PROCEDURE:  Ultrasound of the Kidneys





HISTORY:


Suprapubic pain





COMPARISON:


Abdomen pelvis CT 2018..





TECHNIQUE:


Sonogram of the kidneys.





FINDINGS:





RIGHT KIDNEY:


Measures: 11.5 x 4.4 x 4.9 cm. 





Normal in size, contour and echogenicity. 





No stone, solid mass lesion or hydronephrosis visualized. 





LEFT KIDNEY:


Measures: 10.5 x 6.2 x 5.6 cm. 





Normal in size, contour and echogenicity. 





No stone, solid mass lesion or hydronephrosis visualized. 





OTHER FINDINGS:


Unremarkable urinary bladder 10.4 cc postvoid residual from prevoid volume of 

149.3 cc.





IMPRESSION:


No suspicious interval renal disease sonographically identified bilaterally as 

compared prior and pelvis CT 2018.





Disposition





- Clinical Impression


Clinical Impression: 


 Dysuria, Symptomatic urinary tract infection








- Disposition


Referrals: 


[facility] [Outside]


Disposition: Routine/Home


Disposition Time: 16:32


Condition: STABLE


Prescriptions: 


Nitrofurantoin Macrocrystals [Macrobid] 100 mg PO BID #13 cap


Phenazopyridine [Pyridium] 200 mg PO TID PRN #6 tab


 PRN Reason: Bladder Spasm


Instructions:  Urinary Tract Infections in Adults, Dysuria, Adult (DC)


Forms:  [employer] (Moldovan)


Print Language: Czech

## 2018-12-16 NOTE — US
Date of service: 



12/16/2018



PROCEDURE:  Ultrasound of the Kidneys



HISTORY:

Suprapubic pain



COMPARISON:

Abdomen pelvis CT 09/07/2018..



TECHNIQUE:

Sonogram of the kidneys.



FINDINGS:



RIGHT KIDNEY:

Measures: 11.5 x 4.4 x 4.9 cm. 



Normal in size, contour and echogenicity. 



No stone, solid mass lesion or hydronephrosis visualized. 



LEFT KIDNEY:

Measures: 10.5 x 6.2 x 5.6 cm. 



Normal in size, contour and echogenicity. 



No stone, solid mass lesion or hydronephrosis visualized. 



OTHER FINDINGS:

Unremarkable urinary bladder 10.4 cc postvoid residual from prevoid 

volume of 149.3 cc.



IMPRESSION:

No suspicious interval renal disease sonographically identified 

bilaterally as compared prior and pelvis CT 09/07/2018.

## 2018-12-18 VITALS — OXYGEN SATURATION: 98 %

## 2019-02-23 ENCOUNTER — HOSPITAL ENCOUNTER (OUTPATIENT)
Dept: HOSPITAL 14 - H.ER | Age: 39
Setting detail: OBSERVATION
Discharge: HOME | End: 2019-02-23
Attending: HOSPITALIST | Admitting: HOSPITALIST
Payer: SELF-PAY

## 2019-02-23 VITALS — TEMPERATURE: 98.2 F

## 2019-02-23 VITALS — OXYGEN SATURATION: 97 % | SYSTOLIC BLOOD PRESSURE: 128 MMHG | HEART RATE: 75 BPM | DIASTOLIC BLOOD PRESSURE: 71 MMHG

## 2019-02-23 VITALS — RESPIRATION RATE: 16 BRPM

## 2019-02-23 VITALS — BODY MASS INDEX: 29.9 KG/M2

## 2019-02-23 DIAGNOSIS — E78.1: ICD-10-CM

## 2019-02-23 DIAGNOSIS — R73.03: ICD-10-CM

## 2019-02-23 DIAGNOSIS — R47.01: ICD-10-CM

## 2019-02-23 DIAGNOSIS — G43.909: ICD-10-CM

## 2019-02-23 DIAGNOSIS — R47.1: ICD-10-CM

## 2019-02-23 DIAGNOSIS — R05: ICD-10-CM

## 2019-02-23 DIAGNOSIS — E78.5: ICD-10-CM

## 2019-02-23 DIAGNOSIS — H53.8: ICD-10-CM

## 2019-02-23 DIAGNOSIS — R53.1: Primary | ICD-10-CM

## 2019-02-23 DIAGNOSIS — Z79.899: ICD-10-CM

## 2019-02-23 DIAGNOSIS — Z83.3: ICD-10-CM

## 2019-02-23 DIAGNOSIS — Z79.82: ICD-10-CM

## 2019-02-23 LAB
ALBUMIN SERPL-MCNC: 3.8 G/DL (ref 3.5–5)
ALBUMIN/GLOB SERPL: 1.1 {RATIO} (ref 1–2.1)
ALT SERPL-CCNC: 27 U/L (ref 21–72)
APTT BLD: 32.7 SECONDS (ref 25.6–37.1)
AST SERPL-CCNC: 21 U/L (ref 17–59)
BASOPHILS # BLD AUTO: 0 K/UL (ref 0–0.2)
BASOPHILS NFR BLD: 0.4 % (ref 0–2)
BUN SERPL-MCNC: 22 MG/DL (ref 9–20)
CALCIUM SERPL-MCNC: 9.1 MG/DL (ref 8.4–10.2)
EOSINOPHIL # BLD AUTO: 0.2 K/UL (ref 0–0.7)
EOSINOPHIL NFR BLD: 3.4 % (ref 0–4)
ERYTHROCYTE [DISTWIDTH] IN BLOOD BY AUTOMATED COUNT: 13.7 % (ref 11.5–14.5)
GFR NON-AFRICAN AMERICAN: > 60
HDLC SERPL-MCNC: 28 MG/DL (ref 30–70)
HGB BLD-MCNC: 14.9 G/DL (ref 12–18)
INR PPP: 0.9
LDLC SERPL-MCNC: 85 MG/DL (ref 0–129)
LYMPHOCYTES # BLD AUTO: 2.4 K/UL (ref 1–4.3)
LYMPHOCYTES NFR BLD AUTO: 35.9 % (ref 20–40)
MCH RBC QN AUTO: 28 PG (ref 27–31)
MCHC RBC AUTO-ENTMCNC: 33.6 G/DL (ref 33–37)
MCV RBC AUTO: 83.5 FL (ref 80–94)
MONOCYTES # BLD: 0.7 K/UL (ref 0–0.8)
MONOCYTES NFR BLD: 9.9 % (ref 0–10)
NEUTROPHILS # BLD: 3.4 K/UL (ref 1.8–7)
NEUTROPHILS NFR BLD AUTO: 50.4 % (ref 50–75)
NRBC BLD AUTO-RTO: 0.1 % (ref 0–0)
PLATELET # BLD: 305 K/UL (ref 130–400)
PMV BLD AUTO: 9.3 FL (ref 7.2–11.7)
PROTHROMBIN TIME: 10.4 SECONDS (ref 9.8–13.1)
RBC # BLD AUTO: 5.32 MIL/UL (ref 4.4–5.9)
WBC # BLD AUTO: 6.8 K/UL (ref 4.8–10.8)

## 2019-02-23 PROCEDURE — 96361 HYDRATE IV INFUSION ADD-ON: CPT

## 2019-02-23 PROCEDURE — 71045 X-RAY EXAM CHEST 1 VIEW: CPT

## 2019-02-23 PROCEDURE — 70496 CT ANGIOGRAPHY HEAD: CPT

## 2019-02-23 PROCEDURE — 70498 CT ANGIOGRAPHY NECK: CPT

## 2019-02-23 PROCEDURE — 80061 LIPID PANEL: CPT

## 2019-02-23 PROCEDURE — 93005 ELECTROCARDIOGRAM TRACING: CPT

## 2019-02-23 PROCEDURE — 86900 BLOOD TYPING SEROLOGIC ABO: CPT

## 2019-02-23 PROCEDURE — 70551 MRI BRAIN STEM W/O DYE: CPT

## 2019-02-23 PROCEDURE — 99284 EMERGENCY DEPT VISIT MOD MDM: CPT

## 2019-02-23 PROCEDURE — 97161 PT EVAL LOW COMPLEX 20 MIN: CPT

## 2019-02-23 PROCEDURE — 86850 RBC ANTIBODY SCREEN: CPT

## 2019-02-23 PROCEDURE — 96360 HYDRATION IV INFUSION INIT: CPT

## 2019-02-23 PROCEDURE — 85610 PROTHROMBIN TIME: CPT

## 2019-02-23 PROCEDURE — 70450 CT HEAD/BRAIN W/O DYE: CPT

## 2019-02-23 PROCEDURE — 82948 REAGENT STRIP/BLOOD GLUCOSE: CPT

## 2019-02-23 PROCEDURE — 85025 COMPLETE CBC W/AUTO DIFF WBC: CPT

## 2019-02-23 PROCEDURE — 84484 ASSAY OF TROPONIN QUANT: CPT

## 2019-02-23 PROCEDURE — 83036 HEMOGLOBIN GLYCOSYLATED A1C: CPT

## 2019-02-23 PROCEDURE — 80053 COMPREHEN METABOLIC PANEL: CPT

## 2019-02-23 PROCEDURE — 85730 THROMBOPLASTIN TIME PARTIAL: CPT

## 2019-02-23 NOTE — MRI
Date of service: 



02/23/2019



PROCEDURE:  MRI BRAIN WITHOUT CONTRAST



HISTORY:

TIA 



COMPARISON:

CT head without contrast performed earlier the same day. 



TECHNIQUE:

Multiplanar, multisequence MR images of the brain were obtained 

without intravenous contrast enhancement.



FINDINGS:



HEMORRHAGE:

None



DWI:

No evidence of an acute or early subacute infarction.



BRAIN PARENCHYMA:

There are few scattered tiny T2/FLAIR hyperintense foci in the 

bifrontal subcortical white matter.  There is no mass, mass effect or 

abnormal extra-axial fluid collection.  There is mild cerebellar 

tonsillar ectopia. 



VENTRICLES:

There is mild age advanced global parenchymal volume loss and 

proportionate enlargement of the ventricles and cortical sulci. 



CRANIUM:

There is normal bone marrow signal pattern.



ORBITS:

Grossly unremarkable.



PARANASAL SINUSES/MASTOIDS:

There are retention cysts/polyps in both maxillary sinus



VASCULAR SYSTEM:

There are normal signal voids in the larger intracranial arteries. 



OTHER FINDINGS:

None. 



IMPRESSION:

No acute intracranial abnormality.



Minimal bifrontal white matter changes are strictly nonspecific.  The 

differential considerations include migraine headache effect, gliosis,

 early chronic microvascular changes, Lyme disease, vasculitis and 

demyelinating disease including multiple sclerosis.  Clinical 

follow-up is advised.



Mild cerebellar tonsillar ectopia.



Retention cysts/polyps in both maxillary sinuses.

## 2019-02-23 NOTE — RAD
Date of service: 



02/23/2019



HISTORY:

 Code Stroke 



COMPARISON:

09/05/2018 



FINDINGS:



LUNGS:

No active pulmonary disease.



PLEURA:

No significant pleural effusion identified, no pneumothorax apparent.



CARDIOVASCULAR:

No atherosclerotic calcification present



Normal.



OSSEOUS STRUCTURES:

No significant abnormalities.



VISUALIZED UPPER ABDOMEN:

Normal.



OTHER FINDINGS:

None.



IMPRESSION:

No active disease. No significant interval change compared to the 

prior examination(s).

## 2019-02-23 NOTE — CT
Date of service: 



02/23/2019



PROCEDURE:  CT HEAD WITHOUT CONTRAST.



HISTORY:

code stroke



COMPARISON:

06/23/2018.



TECHNIQUE:

Axial computed tomography images were obtained through the head/brain 

without intravenous contrast.  



Supplemental Coronal and Sagittal projections created and reviewed.



Radiation dose:



Total exam DLP = <inf_radiation_dlp> mGy-cm.



This CT exam was performed using one or more of the following dose 

reduction techniques: Automated exposure control, adjustment of the 

mA and/or kV according to patient size, and/or use of iterative 

reconstruction technique.



FINDINGS:



HEMORRHAGE:

No intracranial hemorrhage. 



BRAIN:

No mass effect or edema.  No atrophy or chronic microvascular 

ischemic changes.



VENTRICLES:

Unremarkable. No hydrocephalus. 



CALVARIUM:

Unremarkable.



PARANASAL SINUSES:

Unremarkable as visualized. No significant inflammatory changes.



MASTOID AIR CELLS:

Unremarkable as visualized. No inflammatory changes.



OTHER FINDINGS:

None.



IMPRESSION:

No acute intracranial abnormalities. No significant findings to 

account for the clinical presentation.  No significant interval 

change compared to the prior examination(s).



__________________________________________



Code stroke protocol:



Study completed 07:48.



Interpretation finalized and available for review 08:03.

## 2019-02-23 NOTE — ED PDOC
HPI:STROKE





- Historian


Historian: Patient,  ([id number])





- Chief Complaint


Chief Complaint: Weakness (left sided )





- Onset


Onset: This morning (just prior to 6am)





- Location


Locate left: Lower extremity





- Notes:


Notes:: 





Torin Mahoney is a 38 year old male with a past medical history of 

diverticulitis and pre-diabetes, who presents to the emergency department 

complaining of left sided weakness. Patient states that he went to the bathroom 

around 6am and went to bed right after. He states he lay in bed staring up at 

the wall and started to fell left-sided weakness and that the whole left side 

would not move as tried to reach for his phone. Patient states that he 

remembered reading online that he should try to cough in this situation. He 

reports that he was initially unable to cough, but after continued attempts was 

able to cough harder and further states that his numbness/weakness started to go

away. Patient tried calling 911, however reports that they hung up on him 

because they were unable to understand him. After about x20 minutes, he 

experienced the symptoms again, prompting him to call 911 again, who told him 

that EMS was on their way. Patient is complaining of mild blurry vision and had 

some trouble speaking. He states that he currently has some dizziness. 





PMD: [facility]








NIHSS Stroke Scale





- Date/Time Evaluation Performed


When Was NIHSS Performed: Baseline





- How Severe is the Stroke


Level of Consciousness: 0=Alert


LOC to Questions: 0=Both comments correct


LOC to commands: 0=Obeys both correctly


Best Gaze: 0=Normal


Visual: 0=No visual loss


Facial: 0=Normal


Motor Arm - Left: 0=No drift


Motor Arm - Right: 0=No drift


Motor Leg - Left: 1=Drift before 5 sec


Motor Leg - Right: 0=No drift


Limb Ataxia: 0=Absent


Sensory: 0=Normal


Best Language: 0=No aphasia


Dysarthia: 0=Normal articulation


Extinction & Inattention (Neglect): 0=Normal, no object


Score: 1





rTPA Inclusion/Exclusion





- Refusal of Treatment


Patient Refused Treatment: No





- Inclusion Criteria for Altepase


Patient is 18 years or Older: Yes


The Clinical Diagnosis of Ischemic Stroke That is Causing a Potentially 

Disabling Neurological Deficit: No


Time of Onset is Well Established to be Less Than 270 Minute Before Treatment 

Would Begin: No


Risk/Benefit Discussed With Patient/Family Member Present: Yes





- Exclusion Criteria for Altepase


Uncontrolled Hypertension at Time of Treatment (Systolic BP above 185 or 

Diastolic BP above 110 mmHg): No


Active Internal Bleeding: No


Known Bleeding Diathesis Including but Not Limited to: Platelets Below 

100,000/mm,PTT Above 40 sec After Heparin Use, Current Use of Oral Anitcoagulant

With INR Greater Than 1.7 or PT Greater Than 15 secs: No


Evidence of an Intracranial Hemorrhage: No


Evidence of Major Acute Infarct With Signs Greater Than 1/3 MCA Territory: No


Suspicion of Subarachnoid Hemorrhage on Pretreatment Evaluation Even if CT Head 

Negative For Hemorrhage: No





- Warning to TPA With Conditions


Following Conditions Weighed Against Anticipated Benefit: No


Condition: Stroke Serevity Too Mild (case d/w Dr. Menard after initial CT head)





Past Medical History


Reviewed: Historical Data, Nursing Documentation, Vital Signs


Vital Signs: 





                                Last Vital Signs











Temp  98.2 F   02/23/19 08:01


 


Pulse  61   02/23/19 08:01


 


Resp  16   02/23/19 08:01


 


BP  128/77   02/23/19 08:01


 


Pulse Ox  100   02/23/19 08:01














- Medical History


PMH: Diabetes (borderline, not on meds), Diverticulitis, Migraine


   Denies: Atrial Fibrillation, Cardia Arrhythmia, HIV, Hypercholesterolemia, 

Chronic Kidney Disease





- Surgical History


Surgical History: No Surg Hx





- Family History


Family History: States: Diabetes





- Immunization History


Hx Tetanus Toxoid Vaccination: No


Hx Influenza Vaccination: No


Hx Pneumococcal Vaccination: No





- Home Medications


Home Medications: 


                                Ambulatory Orders











 Medication  Instructions  Recorded


 


Ciprofloxacin HCl [Cipro] 500 mg PO BID #14 tab 09/07/18


 


Metronidazole [Flagyl] 500 mg PO Q8 #21 tab 09/07/18


 


Nitrofurantoin Macrocrystals 100 mg PO BID #13 cap 12/16/18





[Macrobid]  


 


Phenazopyridine [Pyridium] 200 mg PO TID PRN #6 tab 12/16/18














- Allergies


Allergies/Adverse Reactions: 


                                    Allergies











Allergy/AdvReac Type Severity Reaction Status Date / Time


 


FISH Allergy  RASH Verified 02/23/19 09:01


 


Fish Containing Products Allergy  RASH Verified 02/23/19 09:01














Review of Systems


ROS Statement: Except As Marked, All Systems Reviewed And Found Negative


Eyes: Positive for: Vision Change


Neurological: Positive for: Weakness, Dizziness





Physical Exam





- Reviewed


Nursing Documentation Reviewed: Yes


Vital Signs Reviewed: Yes





- Physical Exam


Appears: Positive for: Non-toxic, No Acute Distress


Head Exam: Positive for: ATRAUMATIC, NORMOCEPHALIC


Skin: Positive for: Normal Color, Warm, Dry


Eye Exam: Positive for: Normal appearance, EOMI, PERRL


ENT: Positive for: Normal ENT Inspection


Neck: Positive for: Normal, Painless ROM, Supple


Cardiovascular/Chest: Positive for: Regular Rate, Rhythm.  Negative for: Murmur


Respiratory: Positive for: Normal Breath Sounds.  Negative for: Respiratory 

Distress


Back: Positive for: Normal Inspection.  Negative for: L CVA Tenderness, R CVA 

Tenderness, Vertebral Tenderness


Extremity: Negative for: Pedal Edema, Deformity


Neurologic/Psych: Positive for: Alert, Oriented, Other (weakness in left lower 

extremity with a decrease in sensation).  Negative for: Facial Droop





- Laboratory Results


Result Diagrams: 


                                 02/23/19 07:50





                                 02/23/19 07:50


Lab Results: 





                                        











PT  10.4 Seconds (9.8-13.1)   02/23/19  07:50    


 


INR  0.9   02/23/19  07:50    


 


APTT  32.7 Seconds (25.6-37.1)   02/23/19  07:50    








                                        











Total Bilirubin  0.3 mg/dl (0.2-1.3)   02/23/19  07:50    


 


AST  21 U/L (17-59)   02/23/19  07:50    


 


ALT  27 U/L (21-72)   02/23/19  07:50    


 


Alkaline Phosphatase  103 U/L ()   02/23/19  07:50    


 


Total Protein  7.2 G/DL (6.3-8.2)   02/23/19  07:50    


 


Albumin  3.8 g/dL (3.5-5.0)   02/23/19  07:50    


 


Globulin  3.4 gm/dL (2.2-3.9)   02/23/19  07:50    


 


Albumin/Globulin Ratio  1.1  (1.0-2.1)   02/23/19  07:50    














- ECG


O2 Sat by Pulse Oximetry: 100 (RA)


Pulse Ox Interpretation: Normal





Medical Decision Making


Medical Decision Making: 





Time: 0750


Impression: possible stroke


Plan: 





--Type and screen 


--CT head without contrast


--EKG


--CMP


--Hemoglobin A1C


--Lipid panel 


--Troponin I 


--PT


--PTT


--CBC with differential 


--Stroke team consult


--Chest xray 


--Sodium chloride 1,000 ml


--Cardiac monitor


--Saline lock


--Vital signs Q15 min


--Glucose, Blood 


--Nursing swallow screen





Time: 0803


Head CT Finding: 





HISTORY:


code stroke





COMPARISON:


06/23/2018.





TECHNIQUE:


Axial computed tomography images were obtained through the head/brain without 

intravenous contrast.  





Supplemental Coronal and Sagittal projections created and reviewed.





Radiation dose:





Total exam DLP = <inf_radiation_dlp> mGy-cm.





This CT exam was performed using one or more of the following dose reduction 

techniques: Automated exposure control, adjustment of the mA and/or kV according

 to patient size, and/or use of iterative reconstruction technique.





FINDINGS:





HEMORRHAGE:


No intracranial hemorrhage. 





BRAIN:


No mass effect or edema.  No atrophy or chronic microvascular ischemic changes.





VENTRICLES:


Unremarkable. No hydrocephalus. 





CALVARIUM:


Unremarkable.





PARANASAL SINUSES:


Unremarkable as visualized. No significant inflammatory changes.





MASTOID AIR CELLS:


Unremarkable as visualized. No inflammatory changes.





OTHER FINDINGS:


None.





IMPRESSION:


No acute intracranial abnormalities. No significant findings to account for the 

clinical presentation.  No significant interval change compared to the prior 

examination(s).





__________________________________________





Code stroke protocol:





Study completed 07:48.





Interpretation finalized and available for review 08:03.








Time: 0810


--Spoke with Dr. Menard, who asked for a CT of the head and neck. 








Time: 0947


CT head/neck FINDINGS:





HEAD:


Right:





 The intracranial internal carotid artery, and anterior and middle cerebral 

arteries are widely patent.





Left:





The intracranial internal carotid artery, and anterior and middle cerebral 

arteries are widely patent. 





Posterior circulation: 





The visualized intracranial vertebral arteries, basilar artery and posterior 

cerebral arteries are widely patent.





There is no endoluminal filling defect to suggest thrombus. There is no 

intracranial saccular aneurysm.





NECK:


There is a three vessel aortic arch. There is no stenosis at the origins of the 

great vessels at the level of the aortic arch. 





No atherosclerotic calcification or mural plaque present.





Right Carotid:





On the right, the common carotid, internal carotid and external carotid arteries

 are widely patent.





There is no hemodynamically significant stenosis in the internal carotid artery 

by NASCET criteria.





Left Carotid:





On the left, the common carotid, internal carotid and external carotid arteries 

are widely patent.





There is no hemodynamically significant stenosis in the internal carotid artery 

by NASCET criteria.





The vertebral arteries are widely patent. 





The visualized soft tissues of the neck are normal. The visualized brain and 

cervical spine are within normal limits.





The lung apices are clear. 





IMPRESSION:


1. No evidence of endoluminal thrombus,occlusion or definite significant 

stenosis in the intracranial arteries. 





2. No evidence of hemodynamically significant stenosis in the internal carotid 

arteries.





3. Patent bilateral vertebral arteries.








Time: 1020


--Patient's symptoms have improved. He is showing no signs of weakness or 

decreased sensation in left lower extremity. 





Time: 1021


--Discussed CT neck findings with Dr. Menard. 


--------

--------------------------------------------------------------------------------


---------





Scribe Attestation:


Documented by Godwin Guerrero, acting as a scribe for Monika Bennett MD. 





Provider Scribe Attestation:


All medical record entries made by the Scribe were at my direction and 

personally dictated by me. I have reviewed the chart and agree that the record 

accurately reflects my personal performance of the history, physical exam, 

medical decision making, and the department course for this patient. I have also

 personally directed, reviewed, and agree with the discharge instructions and d

isposition.





Disposition





- Clinical Impression


Clinical Impression: 


 TIA (transient ischemic attack)








- Patient ED Disposition


Is Patient to be Admitted: Yes


Doctor Will See Patient In The: Hospital





- Disposition


Disposition: Transfer of Care


Disposition Time: 10:20


Condition: FAIR


Instructions:  Transient Ischemic Attack


Forms:  Selleration (English)





- Pt Status Changed To:


Hospital Disposition Of: Observation





- POA


Present On Arrival: None

## 2019-02-23 NOTE — CT
Date of service: 



02/23/2019



PROCEDURE:  CTA HEAD AND NECK WITH CONTRAST



HISTORY:

Left leg weakness



COMPARISON:

None available. 



TECHNIQUE:

Initial noncontrast head CT was performed. Subsequently, CT angiogram 

of the head and neck were performed after the intravenous 

administration of 80 mL of Omnipaque 350.  Contiguous 1.5mm thick 

images were obtained in the axial plane of the neck. 2-D coronal and 

sagittal MPR images were obtained. Imaging postprocessing was 

performed with 3-D images also obtained. A delayed contrast head CT 

was also obtained. This CT exam was performed using one or more of 

the following dose reduction techniques: Automated exposure control, 

adjustment of the mA and/or kV according to patient size, and/or use 

of iterative reconstruction technique.



Contrast dose: 99 cc Visipaque 320



Radiation dose:



Total exam DLP = 494.26 mGy-cm.



FINDINGS:



HEAD:

Right:



 The intracranial internal carotid artery, and anterior and middle 

cerebral arteries are widely patent.



Left:



The intracranial internal carotid artery, and anterior and middle 

cerebral arteries are widely patent. 



Posterior circulation: 



The visualized intracranial vertebral arteries, basilar artery and 

posterior cerebral arteries are widely patent.



There is no endoluminal filling defect to suggest thrombus. There is 

no intracranial saccular aneurysm.



NECK:

There is a three vessel aortic arch. There is no stenosis at the 

origins of the great vessels at the level of the aortic arch. 



No atherosclerotic calcification or mural plaque present.



Right Carotid:



On the right, the common carotid, internal carotid and external 

carotid arteries are widely patent.



There is no hemodynamically significant stenosis in the internal 

carotid artery by NASCET criteria.



Left Carotid:



On the left, the common carotid, internal carotid and external 

carotid arteries are widely patent.



There is no hemodynamically significant stenosis in the internal 

carotid artery by NASCET criteria.



The vertebral arteries are widely patent. 



The visualized soft tissues of the neck are normal. The visualized 

brain and cervical spine are within normal limits.



The lung apices are clear. 



IMPRESSION:

1. No evidence of endoluminal thrombus,occlusion or definite 

significant stenosis in the intracranial arteries. 



2. No evidence of hemodynamically significant stenosis in the 

internal carotid arteries.



3. Patent bilateral vertebral arteries.

## 2019-02-23 NOTE — CP.PCM.CON
History of Present Illness





- History of Present Illness


History of Present Illness: 





Neurology consult dictated. 


NIH stroke scale 0.


in brief, 38 yr old male who had a spell of leg weakness that resolved and is 

now back to baseline. MRI Brain is normal with no sign of acute stroke, CTA head

and neck is normal as well. 


ROS: negative. 





A/p: Patient with event not likely to be TIA, ready for discharge. 


Plan:


1. Continue aspirin


2. Neurology follow up with our office in 2 weeks - dr wade


Thank you


                                 





Past Patient History





- Infectious Disease


Hx of Infectious Diseases: None





- Tetanus Immunizations


Tetanus Immunization: Unknown





- Past Medical History & Family History


Past Medical History?: Yes





- Past Social History


Smoking Status: Never Smoked





- CARDIAC


Hx Atrial Fibrillation: No


Hx Cardia Arrhythmia: No


Hx Hypercholesterolemia: No





- PULMONARY


Hx Respiratory Disorders: No





- NEUROLOGICAL


Hx Migraine: Yes





- HEENT


Hx HEENT Problems: No





- RENAL


Hx Chronic Kidney Disease: No





- ENDOCRINE/METABOLIC


Hx Endocrine Disorders: Yes (pre diabetic according to pt)





- HEMATOLOGICAL/ONCOLOGICAL


Hx Human Immunodeficiency Virus (HIV): No





- INTEGUMENTARY


Hx Dermatological Problems: No





- MUSCULOSKELETAL/RHEUMATOLOGICAL


Hx Musculoskeletal Disorders: No


Hx Falls: No





- GASTROINTESTINAL


Hx Diverticulitis: Yes





- GENITOURINARY/GYNECOLOGICAL


Hx Genitourinary Disorders: No





- PSYCHIATRIC


Hx Psychophysiologic Disorder: No


Hx Substance Use: No





- SURGICAL HISTORY


Hx Surgeries: No


Other/Comment: +COLONOSCOPY





- ANESTHESIA


Hx Anesthesia: No


Hx Anesthesia Reactions: No





Meds


Home Medications: 


                              Home Medication List











 Medication  Instructions  Recorded  Confirmed  Type


 


Aspirin [Low Dose Aspirin EC] 81 mg PO DAILY #30 tablet. 02/23/19  Rx


 


Atorvastatin [Lipitor] 10 mg PO DAILY #30 tab 02/23/19  Rx











Allergies/Adverse Reactions: 


                                    Allergies











Allergy/AdvReac Type Severity Reaction Status Date / Time


 


FISH Allergy  RASH Verified 02/23/19 09:01


 


Fish Containing Products Allergy  RASH Verified 02/23/19 09:01














- Medications


Medications: 


                               Current Medications





Acetaminophen (Tylenol 325mg Tab)  650 mg PO Q6 PRN


   PRN Reason: Pain, Mild (1-3)


Acetaminophen (Tylenol 325mg Tab)  650 mg PO Q6 PRN


   PRN Reason: Fever >100.4 F


Enoxaparin Sodium (Lovenox)  40 mg SC DAILY LEORA; Protocol


Sodium Chloride (Sodium Chloride 0.9%)  1,000 mls @ 100 mls/hr IV .Q10H LEORA


   Last Admin: 02/23/19 08:05 Dose:  100 mls/hr











Results





- Vital Signs


Recent Vital Signs: 


                                Last Vital Signs











Temp  98.2 F   02/23/19 08:01


 


Pulse  70   02/23/19 09:19


 


Resp  16   02/23/19 09:19


 


BP  128/77   02/23/19 09:19


 


Pulse Ox  100   02/23/19 10:38














- Labs


Result Diagrams: 


                                 02/23/19 07:50





                                 02/23/19 07:50


Labs: 


                         Laboratory Results - last 24 hr











  02/23/19 02/23/19 02/23/19





  07:48 07:50 07:50


 


WBC   6.8 


 


RBC   5.32 


 


Hgb   14.9 


 


Hct   44.5 


 


MCV   83.5 


 


MCH   28.0 


 


MCHC   33.6 


 


RDW   13.7 


 


Plt Count   305 


 


MPV   9.3 


 


Neut % (Auto)   50.4 


 


Lymph % (Auto)   35.9 


 


Mono % (Auto)   9.9 


 


Eos % (Auto)   3.4 


 


Baso % (Auto)   0.4 


 


Neut # (Auto)   3.4 


 


Lymph # (Auto)   2.4 


 


Mono # (Auto)   0.7 


 


Eos # (Auto)   0.2 


 


Baso # (Auto)   0.0 


 


PT   


 


INR   


 


APTT   


 


Sodium    140


 


Potassium    4.2


 


Chloride    103


 


Carbon Dioxide    24


 


Anion Gap    17


 


BUN    22 H


 


Creatinine    0.8


 


Est GFR ( Amer)    > 60


 


Est GFR (Non-Af Amer)    > 60


 


POC Glucose (mg/dL)  100  


 


Random Glucose    119 H


 


Calcium    9.1


 


Total Bilirubin    0.3


 


AST    21


 


ALT    27


 


Alkaline Phosphatase    103


 


Troponin I    < 0.0120


 


Total Protein    7.2


 


Albumin    3.8


 


Globulin    3.4


 


Albumin/Globulin Ratio    1.1


 


Triglycerides    403 H D


 


Cholesterol    156


 


LDL Cholesterol Direct    85


 


HDL Cholesterol    28 L


 


Blood Type   


 


Antibody Screen   


 


BBK History Checked   














  02/23/19 02/23/19





  07:50 07:50


 


WBC  


 


RBC  


 


Hgb  


 


Hct  


 


MCV  


 


MCH  


 


MCHC  


 


RDW  


 


Plt Count  


 


MPV  


 


Neut % (Auto)  


 


Lymph % (Auto)  


 


Mono % (Auto)  


 


Eos % (Auto)  


 


Baso % (Auto)  


 


Neut # (Auto)  


 


Lymph # (Auto)  


 


Mono # (Auto)  


 


Eos # (Auto)  


 


Baso # (Auto)  


 


PT  10.4 


 


INR  0.9 


 


APTT  32.7 


 


Sodium  


 


Potassium  


 


Chloride  


 


Carbon Dioxide  


 


Anion Gap  


 


BUN  


 


Creatinine  


 


Est GFR ( Amer)  


 


Est GFR (Non-Af Amer)  


 


POC Glucose (mg/dL)  


 


Random Glucose  


 


Calcium  


 


Total Bilirubin  


 


AST  


 


ALT  


 


Alkaline Phosphatase  


 


Troponin I  


 


Total Protein  


 


Albumin  


 


Globulin  


 


Albumin/Globulin Ratio  


 


Triglycerides  


 


Cholesterol  


 


LDL Cholesterol Direct  


 


HDL Cholesterol  


 


Blood Type   O POSITIVE


 


Antibody Screen   Negative


 


BBK History Checked   No verified bt

## 2019-02-23 NOTE — CP.PCM.HP
History of Present Illness





- History of Present Illness


History of Present Illness: 





37 y/o M with a PMHx of diverticulitis, pre-diabetes and hypertriglyceridemia 

presented to ED complaining of L side weakness that occurred around 6 am today. 

Pt explains his left arm and left leg were weak after he used the bathroom. W

hile he was lying on bed, pt was unable to move his L side; hence, he called 

911. However, he was unable to speak clearly. Pt reports previous episodes 

resolved after several minutes but they occurred again and called 911 again and 

was able to communicate with . Pt also reported blurry vision with 

episodes. No previous episodes. No recent trauma. 





PMD: [facility]





NKDA


Meds: none





PMHx: Pre-diabetes, diverticulitis and hypertrigliceridemia


PSHx: denied


FHx: Father with DM2.


SHx: denied tobacco, alcohol or rec drugs. 








Present on Admission





- Present on Admission


Any Indicators Present on Admission: No





Review of Systems





- Constitutional


Constitutional: Weakness.  absent: Anorexia, Chills, Fever





- EENT


Nose/Mouth/Throat: absent: Nasal Congestion, Sore Throat, Neck Pain, Neck Mass





- Cardiovascular


Cardiovascular: absent: Chest Pain, Chest Pain at Rest, Dyspnea





- Respiratory


Respiratory: absent: Cough, Dyspnea, Hemoptysis, Dyspnea on Exertion





- Gastrointestinal


Gastrointestinal: absent: Abdominal Pain, Belching, Bloating





Past Patient History





- Infectious Disease


Hx of Infectious Diseases: None





- Tetanus Immunizations


Tetanus Immunization: Unknown





- Past Medical History & Family History


Past Medical History?: Yes





- Past Social History


Smoking Status: Never Smoked





- CARDIAC


Hx Atrial Fibrillation: No


Hx Cardia Arrhythmia: No


Hx Hypercholesterolemia: No





- PULMONARY


Hx Respiratory Disorders: No





- NEUROLOGICAL


Hx Migraine: Yes





- HEENT


Hx HEENT Problems: No





- RENAL


Hx Chronic Kidney Disease: No





- ENDOCRINE/METABOLIC


Hx Endocrine Disorders: Yes (pre diabetic according to pt)





- HEMATOLOGICAL/ONCOLOGICAL


Hx Human Immunodeficiency Virus (HIV): No





- INTEGUMENTARY


Hx Dermatological Problems: No





- MUSCULOSKELETAL/RHEUMATOLOGICAL


Hx Musculoskeletal Disorders: No


Hx Falls: No





- GASTROINTESTINAL


Hx Diverticulitis: Yes





- GENITOURINARY/GYNECOLOGICAL


Hx Genitourinary Disorders: No





- PSYCHIATRIC


Hx Psychophysiologic Disorder: No


Hx Substance Use: No





- SURGICAL HISTORY


Hx Surgeries: No


Other/Comment: +COLONOSCOPY





- ANESTHESIA


Hx Anesthesia: No


Hx Anesthesia Reactions: No





Meds


Home Medications: 


                              Home Medication List











 Medication  Instructions  Recorded  Confirmed  Type


 


Aspirin [Low Dose Aspirin EC] 81 mg PO DAILY #30 tablet. 02/23/19  Rx


 


Atorvastatin [Lipitor] 10 mg PO DAILY #30 tab 02/23/19  Rx











Allergies/Adverse Reactions: 


                                    Allergies











Allergy/AdvReac Type Severity Reaction Status Date / Time


 


FISH Allergy  RASH Verified 02/23/19 09:01


 


Fish Containing Products Allergy  RASH Verified 02/23/19 09:01














Physical Exam





- Constitutional


Appears: Well, No Acute Distress





- Head Exam


Head Exam: NORMAL INSPECTION





- Eye Exam


Eye Exam: EOMI, Normal appearance





- ENT Exam


ENT Exam: Mucous Membranes Moist





- Neck Exam


Neck exam: Positive for: Full Rom, Normal Inspection





- Respiratory Exam


Respiratory Exam: Clear to Auscultation Bilateral, NORMAL BREATHING PATTERN





- Cardiovascular Exam


Cardiovascular Exam: REGULAR RHYTHM, +S1, +S2





- GI/Abdominal Exam


GI & Abdominal Exam: Soft.  absent: Distended, Guarding, Tenderness





- Extremities Exam


Extremities exam: Positive for: full ROM, normal capillary refill, normal 

inspection, pedal pulses present.  Negative for: calf tenderness, pedal edema, 

tenderness





- Back Exam


Back exam: absent: CVA tenderness (L), CVA tenderness (R)





- Neurological Exam


Neurological exam: Alert, CN II-XII Intact, Normal Gait, Oriented x3, Reflexes 

Normal





- Psychiatric Exam


Psychiatric exam: Normal Mood





Results





- Vital Signs


Recent Vital Signs: 





                                Last Vital Signs











Temp  98.2 F   02/23/19 08:01


 


Pulse  70   02/23/19 09:19


 


Resp  16   02/23/19 09:19


 


BP  128/77   02/23/19 09:19


 


Pulse Ox  100   02/23/19 10:38














- Labs


Result Diagrams: 


                                 02/23/19 07:50





                                 02/23/19 07:50


Labs: 





                         Laboratory Results - last 24 hr











  02/23/19 02/23/19 02/23/19





  07:48 07:50 07:50


 


WBC   6.8 


 


RBC   5.32 


 


Hgb   14.9 


 


Hct   44.5 


 


MCV   83.5 


 


MCH   28.0 


 


MCHC   33.6 


 


RDW   13.7 


 


Plt Count   305 


 


MPV   9.3 


 


Neut % (Auto)   50.4 


 


Lymph % (Auto)   35.9 


 


Mono % (Auto)   9.9 


 


Eos % (Auto)   3.4 


 


Baso % (Auto)   0.4 


 


Neut # (Auto)   3.4 


 


Lymph # (Auto)   2.4 


 


Mono # (Auto)   0.7 


 


Eos # (Auto)   0.2 


 


Baso # (Auto)   0.0 


 


PT   


 


INR   


 


APTT   


 


Sodium    140


 


Potassium    4.2


 


Chloride    103


 


Carbon Dioxide    24


 


Anion Gap    17


 


BUN    22 H


 


Creatinine    0.8


 


Est GFR ( Amer)    > 60


 


Est GFR (Non-Af Amer)    > 60


 


POC Glucose (mg/dL)  100  


 


Random Glucose    119 H


 


Calcium    9.1


 


Total Bilirubin    0.3


 


AST    21


 


ALT    27


 


Alkaline Phosphatase    103


 


Troponin I    < 0.0120


 


Total Protein    7.2


 


Albumin    3.8


 


Globulin    3.4


 


Albumin/Globulin Ratio    1.1


 


Triglycerides    403 H D


 


Cholesterol    156


 


LDL Cholesterol Direct    85


 


HDL Cholesterol    28 L


 


Blood Type   


 


Antibody Screen   


 


BBK History Checked   














  02/23/19 02/23/19





  07:50 07:50


 


WBC  


 


RBC  


 


Hgb  


 


Hct  


 


MCV  


 


MCH  


 


MCHC  


 


RDW  


 


Plt Count  


 


MPV  


 


Neut % (Auto)  


 


Lymph % (Auto)  


 


Mono % (Auto)  


 


Eos % (Auto)  


 


Baso % (Auto)  


 


Neut # (Auto)  


 


Lymph # (Auto)  


 


Mono # (Auto)  


 


Eos # (Auto)  


 


Baso # (Auto)  


 


PT  10.4 


 


INR  0.9 


 


APTT  32.7 


 


Sodium  


 


Potassium  


 


Chloride  


 


Carbon Dioxide  


 


Anion Gap  


 


BUN  


 


Creatinine  


 


Est GFR ( Amer)  


 


Est GFR (Non-Af Amer)  


 


POC Glucose (mg/dL)  


 


Random Glucose  


 


Calcium  


 


Total Bilirubin  


 


AST  


 


ALT  


 


Alkaline Phosphatase  


 


Troponin I  


 


Total Protein  


 


Albumin  


 


Globulin  


 


Albumin/Globulin Ratio  


 


Triglycerides  


 


Cholesterol  


 


LDL Cholesterol Direct  


 


HDL Cholesterol  


 


Blood Type   O POSITIVE


 


Antibody Screen   Negative


 


BBK History Checked   No verified bt














Assessment & Plan





- Assessment and Plan (Free Text)


Assessment: 





37 y/o M with a PMHx of pre-diabetes and hypertriglyceridemia presented to ED 

with transient L upper and lower extremity weakness, admitted for observation. 





PLAN:





>Left side weakness


--Resolved


--Likely Transient Ischemic Attack vs seizures.


--Head CT w/ NO intracranial abnormalities


--CTA of head and neck unremarkable


--MRI of brain normal except for minimal bifrontal white matter changes. 


--Neurology evaluated patient, pt will f/u with Dr Menard as outpatient. 


--Pt is discharged home with prescriptions for outpatient MRI Brain with osei and

 EEG, will f/u with Dr Menard and PCP as outpatient. 


--Rx for aAspirin 81mg and Lipitor 10mg PO daily givent to pt. 





>Pre-diabetes


--Lifestyle modifications reinforced.


--D/C home





>Hyperlipidemia


--Rx for lipitor given


--Lifestyle modifications reinforced.


--D/C home








Case discussed with Dr Cooper Espinal PGY-2








- Date & Time


Date: 02/23/19


Time: 14:00

## 2019-02-24 NOTE — CARD
--------------- APPROVED REPORT --------------





Date of service: 02/23/2019



EKG Measurement

Heart [id number]

IL [id number]

[id number]

[id number]

[id number]



<Conclusion>

Normal sinus rhythm

Normal ECG

## 2019-02-25 NOTE — CON
DATE:  02/23/2019



NEUROLOGY CONSULTATION



Called by Dr. Bennett in [facility] Emergency Room.



HISTORY OF PRESENT ILLNESS:  The patient is a 38-year-old male who was at

home today sleeping, when he went to the bathroom around 6 a.m. and went

back to sleep.  As he lay in bed sliding up the wall, he started to feel

left sided weakness slowly starting on progressing to leg at the point

where he could reach his thumb and then he was able to cough which resolved

his numbness and weakness, tried to call 911, and experienced some

dysarthria and after 20 minutes he experiences the symptom again, called

911 again and EMS was on their way brought him to ER.  He also had dysarthria

and aphasia.  On examination in the emergency room, there

was no aphasia noted.  There was some mild left sided weakness that

resolved after getting CAT scan.  On my exam, the patient states

that he felt that his left side was weak and that he had some difficulty

speaking.  He also says that this has never happened before.  The patient

denies any sick contacts, any recent stress, any insomnia, any motor

vehicle accident, any head trauma, or any passing out image.



REVIEW OF SYSTEMS:  Malaise, fatigue, weakness, and headache.  No nausea,

vomiting, diarrhea, or visual symptoms.



PAST MEDICAL HISTORY:  Prediabetes and diverticulitis.



PAST SURGICAL HISTORY:  None.



SOCIAL HISTORY:  The patient is currently employed.  No tobacco. 

Occasional alcohol.



ALLERGIES:  NO KNOWN DRUG ALLERGIES.



PHYSICAL EXAMINATION:

GENERAL:  The patient is alert and oriented x3.

HEENT:  Pupils are equal, round, reactive to light.  Extraocular muscles

are intact.  Cranial nerves II through XII are normal.

NEUROLOGIC:  Mini mental status is 30/30. He can name and repeat. Intact fine 
touch, pin, position sense. 

Cerebellar; finger-to-nose shows no dysmetria.   Motor strength normal 5/5 upper
limb bilaterally.  Sensory is

intact to f

Gait is normal.   Reflexes are +1 and +2 bilaterally.  Toes are downgoing. 

There is no clonus.



LABORATORY DATA:  White count 6.8, hemoglobin 40.9, hematocrit 44.5, and

platelets 305.  Sodium 140, potassium 4.2, bicarb 103, chloride 24, BUN 22,

creatinine 0.8, and glucose 119.  Coags are normal.  LFTs were normal.  CAT

was normal.  MRI of the brain was done, it was normal.  CT of head and neck

is completely normal with paravertebral and intracranial arteries.



IMPRESSION:  This is a 38-year-old male who did not have TIA as per MRI

being normal.  The patient does not have any risk factors either, however

he may have had a small seizure.   MRI of the brain with and

without gadolinium and EEG.



Thank you for this interesting consultation.  Dr. Bennett will follow up if

admitted.







__________________________________________

Shilpi Menard MD





DD:  02/23/2019 13:41:45

DT:  02/23/2019 15:36:03

Job # [id number]

[email or web address]

## 2019-02-28 ENCOUNTER — HOSPITAL ENCOUNTER (EMERGENCY)
Dept: HOSPITAL 14 - H.ER | Age: 39
Discharge: HOME | End: 2019-02-28
Payer: SELF-PAY

## 2019-02-28 VITALS — RESPIRATION RATE: 18 BRPM | OXYGEN SATURATION: 98 % | TEMPERATURE: 98.3 F

## 2019-02-28 VITALS — HEART RATE: 66 BPM | SYSTOLIC BLOOD PRESSURE: 133 MMHG | DIASTOLIC BLOOD PRESSURE: 90 MMHG

## 2019-02-28 VITALS — BODY MASS INDEX: 29.9 KG/M2

## 2019-02-28 DIAGNOSIS — S09.90XA: Primary | ICD-10-CM
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
## 2019-02-28 DIAGNOSIS — S01.81XA: ICD-10-CM

## 2019-02-28 RX ADMIN — CLOSTRIDIUM TETANI TOXOID ANTIGEN (FORMALDEHYDE INACTIVATED), CORYNEBACTERIUM DIPHTHERIAE TOXOID ANTIGEN (FORMALDEHYDE INACTIVATED), BORDETELLA PERTUSSIS TOXOID ANTIGEN (GLUTARALDEHYDE INACTIVATED), BORDETELLA PERTUSSIS FILAMENTOUS HEMAGGLUTININ ANTIGEN (FORMALDEHYDE INACTIVATED), BORDETELLA PERTUSSIS PERTACTIN ANTIGEN, AND BORDETELLA PERTUSSIS FIMBRIAE 2/3 ANTIGEN ONE ML: 5; 2; 2.5; 5; 3; 5 INJECTION, SUSPENSION INTRAMUSCULAR at 16:20

## 2019-02-28 NOTE — CT
Date of service: 



02/28/2019



PROCEDURE:  CT HEAD WITHOUT CONTRAST.



HISTORY:

Trauma 



COMPARISON:

Comparison made with prior CT scan and MRI of the brain both dated 

223 2019. 



TECHNIQUE:

Axial computed tomography images were obtained through the head/brain 

without intravenous contrast.  



Radiation dose:



Total exam DLP = 872.53 mGy-cm.



This CT exam was performed using one or more of the following dose 

reduction techniques: Automated exposure control, adjustment of the 

mA and/or kV according to patient size, and/or use of iterative 

reconstruction technique.



FINDINGS:



HEMORRHAGE:

No acute parenchymal, subarachnoid nor extra-axial hemorrhage. 



BRAIN:

No evidence of large acute infarct.  Previously noted very tiny 

nonspecific foci of increased T2 signal scattered about the bifrontal 

subcortical white matter not appreciated on this study due to very 

small size. 



Slightly low lying left-sided cerebellar tonsil 



VENTRICLES:

The ventricles are prominent though do not appear to be under 

tension.  Findings may be an anatomic variant .



CALVARIUM:

There are no acute calvarial fractures.  There may be some mild left 

supraorbital soft tissue- scalp swelling. 



PARANASAL SINUSES:

Unremarkable as visualized. No significant inflammatory changes.



MASTOID AIR CELLS:

Unremarkable as visualized. No inflammatory changes.



OTHER FINDINGS:

None.



IMPRESSION:

No evidence of acute intracranial hemorrhage. 



Suspect mild left supraorbital soft tissue swelling. 



Prominent ventricles of possibly representing an anatomic
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
## 2019-04-06 ENCOUNTER — HOSPITAL ENCOUNTER (EMERGENCY)
Dept: HOSPITAL 14 - H.ER | Age: 39
Discharge: HOME | End: 2019-04-06
Payer: SELF-PAY

## 2019-04-06 VITALS
OXYGEN SATURATION: 100 % | RESPIRATION RATE: 17 BRPM | DIASTOLIC BLOOD PRESSURE: 79 MMHG | SYSTOLIC BLOOD PRESSURE: 133 MMHG | HEART RATE: 79 BPM

## 2019-04-06 VITALS — BODY MASS INDEX: 29.9 KG/M2

## 2019-04-06 DIAGNOSIS — S09.90XA: Primary | ICD-10-CM

## 2019-04-06 DIAGNOSIS — R73.03: ICD-10-CM

## 2019-04-06 DIAGNOSIS — S01.312A: ICD-10-CM

## 2019-04-06 DIAGNOSIS — E78.00: ICD-10-CM

## 2019-04-06 DIAGNOSIS — S29.9XXA: ICD-10-CM
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
## 2019-04-06 DIAGNOSIS — Z86.73: ICD-10-CM
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
## 2019-04-06 NOTE — RAD
Date of service: 



04/06/2019



PROCEDURE:  Radiographs of the left tibia and fibula. 



HISTORY:

pain



COMPARISON:

None available.



TECHNIQUE:

Frontal and lateral views obtained. 2 views obtained.



FINDINGS:



BONES:

No fracture or destructive lesion.



JOINT SPACES:

Unremarkable.



OTHER FINDINGS:

None.



IMPRESSION:

Unremarkable radiographs of the left tibia and fibula.

## 2019-04-06 NOTE — RAD
Date of service: 



04/06/2019



PROCEDURE:  Radiographs of the left clavicle.



HISTORY:

pain



COMPARISON:

None.



TECHNIQUE:

2 views obtained.



FINDINGS:



LEFT CLAVICLE:

No fracture or focal lesion. 



JOINTS:

No dislocation or subluxation left acromioclavicular or glenohumeral 

joints.  Degenerative changes are identified moderate at the 

acromioclavicular joint with none at the glenohumeral joint. 



SOFT TISSUES:

Grossly unremarkable. 



OTHER FINDINGS:

None.



IMPRESSION:

Degenerative changes seen the acromioclavicular joint with left 

clavicle otherwise unremarkable.

## 2019-04-06 NOTE — RAD
Date of service: 



04/06/2019



HISTORY:

 dyspnea 



COMPARISON:

Portable chest 02/23/2019.



TECHNIQUE:

Chest PA and lateral views



FINDINGS:



LUNGS:

No active pulmonary disease.



PLEURA:

No significant pleural effusion identified. No pneumothorax apparent.



CARDIOVASCULAR:

No aortic atherosclerotic calcification present.



Normal cardiac size. No pulmonary vascular congestion. 



OSSEOUS STRUCTURES:

No significant abnormalities.



VISUALIZED UPPER ABDOMEN:

Normal.



OTHER FINDINGS:

None.



IMPRESSION:

No interval acute cardiopulmonary disease appreciated.

## 2019-04-06 NOTE — RAD
Date of service: 



04/06/2019



PROCEDURE:  Left Femur Radiographs.



HISTORY:

pain



COMPARISON:

None.



TECHNIQUE:

AP and Lateral Radiographs of the left femur. 4 views obtained.



FINDINGS:



FEMUR:

Normal. No fracture. 



SOFT TISSUES:

Normal. 



OTHER FINDINGS:

None.



IMPRESSION:

Unremarkable radiographs of the left femur.

## 2019-04-07 NOTE — CT
Date of service: 



04/06/2019



PROCEDURE:  CT HEAD WITHOUT CONTRAST.



HISTORY:

headache



COMPARISON:

Unenhanced head CT 02/20/2019.



TECHNIQUE:

Axial computed tomography images were obtained through the head/brain 

without intravenous contrast.  



Radiation dose:



Total exam DLP = 881.45 mGy-cm.



This CT exam was performed using one or more of the following dose 

reduction techniques: Automated exposure control, adjustment of the 

mA and/or kV according to patient size, and/or use of iterative 

reconstruction technique.



FINDINGS:



HEMORRHAGE:

No intracranial hemorrhage. 



BRAIN:

Normal gray-white matter differentiation and density are appreciated 

throughout the cerebrum and cerebellum with the brainstem appearing 

unremarkable as well.  There is no mass effect.  There is no 

suspicious extra-axial fluid collection and the midline brain anatomy 

appears diffusely unremarkable. 



VENTRICLES:

Unremarkable. No hydrocephalus. 



CALVARIUM:

No destructive bony lesion or displaced fracture identified including 

through the skullbase.



PARANASAL SINUSES:

Trace right maxillary sinus disease appreciated acutely.



MASTOID AIR CELLS:

Unremarkable as visualized. No inflammatory changes.



OTHER FINDINGS:

None.



IMPRESSION:

Stable unremarkable unenhanced head CT.  Trace maxillary sinus 

disease appreciated acutely.  



[facility] preliminary report from [facility], 04/06/2019, 8:02 p.m..

## 2019-04-07 NOTE — CT
Date of service: 



04/06/2019



PROCEDURE:  CT Cervical Spine without contrast



HISTORY:

neck pain



COMPARISON:

None available.



TECHNIQUE:

Axial computed tomography images were obtained of the cervical spine 

without the use of intravenous contrast. Coronal and sagittal 

reformatted images were created and reviewed.



Radiation dose:



Total exam DLP = 363.64 mGy-cm.



This CT exam was performed using one or more of the following dose 

reduction techniques: Automated exposure control, adjustment of the 

mA and/or kV according to patient size, and/or use of iterative 

reconstruction technique.



FINDINGS:



VERTEBRAE:

No fracture. Normal alignment. No destructive bony lesion.



DISCS/SPINAL CANAL/NEURAL FORAMINA:

No significant central canal or neural foraminal stenosis. 



Disc heights are adequately preserved.



At C3-4, there is a small central disc protrusion without stenosis.  

No neural foraminal stenosis. 



At C4-5, no central canal or neural foraminal stenosis appreciable.



At C5-6, no central canal or neural foraminal stenosis appreciated.



At C6-7 and C7-T1, no central canal or neural foraminal stenosis 

appreciated.



PARASPINAL SOFT TISSUES:

Unremarkable. 



OTHER FINDINGS:

None.



IMPRESSION:

No fracture or spondylolisthesis identified.  Small central 

protrusion C3-4 without significant stenosis.



Preliminary report provided by [facility]ja, 04/06/2019, 8:10 p.m..

## 2019-04-07 NOTE — CT
Date of service: 



04/06/2019



PROCEDURE:  CT MAXILLOFACIAL BONES WITHOUT CONTRAST



HISTORY:

facial pain; injury to left ear



COMPARISON:

None available.



TECHNIQUE:

Contiguous axial CT  images of the maxillofacial bones were obtained. 

Coronal and sagittal reformats were generated.



Radiation dose:



Total exam DLP = 793.07 mGy-cm.



This CT exam was performed using one or more of the following dose 

reduction techniques: Automated exposure control, adjustment of the 

mA and/or kV according to patient size, and/or use of iterative 

reconstruction technique.



FINDINGS:



NASAL BONES:

Unremarkable.



ORBITS:

Unremarkable.



PARANASAL SINUSES/ MASTOIDS:

Minimal mucosal inflammatory changes bilateral maxillary sinuses.



MAXILLA:

Unremarkable.



MANDIBLE/ TEMPOROMANDIBULAR JOINTS:

Unremarkable.



SKULL BASE:

Unremarkable.



TEMPORAL BONES:

Middle ears and mastoid grossly unremarkable.



OTHER FINDINGS:

None.



IMPRESSION:

Unremarkable non contrast enhanced CT of the maxillofacial bones.  

Incidental minimal bilateral maxillary sinus disease appreciated. 



[facility] preliminary report from [facility], 04/06/2019, 8:09 p.m..